# Patient Record
Sex: FEMALE | Race: WHITE | NOT HISPANIC OR LATINO | Employment: OTHER | URBAN - METROPOLITAN AREA
[De-identification: names, ages, dates, MRNs, and addresses within clinical notes are randomized per-mention and may not be internally consistent; named-entity substitution may affect disease eponyms.]

---

## 2023-11-29 DIAGNOSIS — M54.16 LUMBAR NEURITIS: Primary | ICD-10-CM

## 2023-11-29 RX ORDER — LIDOCAINE 50 MG/G
1 PATCH TOPICAL DAILY
COMMUNITY
End: 2023-11-29 | Stop reason: SDUPTHER

## 2023-12-01 RX ORDER — LIDOCAINE 50 MG/G
1 PATCH TOPICAL DAILY
Qty: 30 PATCH | Refills: 11 | Status: SHIPPED | OUTPATIENT
Start: 2023-12-01

## 2023-12-06 ENCOUNTER — OFFICE VISIT (OUTPATIENT)
Dept: PAIN MEDICINE | Facility: CLINIC | Age: 58
End: 2023-12-06
Payer: COMMERCIAL

## 2023-12-06 DIAGNOSIS — M51.26 LUMBAR DISC HERNIATION: ICD-10-CM

## 2023-12-06 DIAGNOSIS — M54.2 CERVICALGIA: ICD-10-CM

## 2023-12-06 DIAGNOSIS — G89.29 CHRONIC NECK PAIN: Primary | ICD-10-CM

## 2023-12-06 DIAGNOSIS — M54.12 CERVICAL RADICULITIS: ICD-10-CM

## 2023-12-06 DIAGNOSIS — M54.16 LUMBAR NEURITIS: ICD-10-CM

## 2023-12-06 DIAGNOSIS — M54.2 CHRONIC NECK PAIN: Primary | ICD-10-CM

## 2023-12-06 PROCEDURE — 99214 OFFICE O/P EST MOD 30 MIN: CPT | Performed by: ANESTHESIOLOGY

## 2023-12-06 RX ORDER — BUSPIRONE HYDROCHLORIDE 5 MG/1
5 TABLET ORAL 2 TIMES DAILY
COMMUNITY

## 2023-12-06 RX ORDER — ALBUTEROL SULFATE 90 UG/1
2 AEROSOL, METERED RESPIRATORY (INHALATION) EVERY 6 HOURS PRN
COMMUNITY

## 2023-12-06 RX ORDER — ATORVASTATIN CALCIUM 10 MG/1
10 TABLET, FILM COATED ORAL DAILY
COMMUNITY

## 2023-12-06 RX ORDER — OMEPRAZOLE 20 MG/1
20 CAPSULE, DELAYED RELEASE ORAL
COMMUNITY

## 2023-12-06 RX ORDER — HYDROCODONE BITARTRATE AND ACETAMINOPHEN 5; 325 MG/1; MG/1
1 TABLET ORAL EVERY 6 HOURS PRN
COMMUNITY

## 2023-12-06 RX ORDER — CYCLOBENZAPRINE HCL 5 MG
5 TABLET ORAL
COMMUNITY

## 2023-12-06 RX ORDER — METOPROLOL SUCCINATE 50 MG/1
50 TABLET, EXTENDED RELEASE ORAL DAILY
COMMUNITY

## 2023-12-06 RX ORDER — IBUPROFEN 800 MG/1
800 TABLET ORAL EVERY 6 HOURS PRN
COMMUNITY

## 2023-12-06 RX ORDER — ACETAMINOPHEN 500 MG
2000 TABLET ORAL DAILY
COMMUNITY

## 2023-12-06 RX ORDER — LISINOPRIL 2.5 MG/1
2.5 TABLET ORAL DAILY
COMMUNITY

## 2023-12-06 SDOH — ECONOMIC STABILITY: FOOD INSECURITY: WITHIN THE PAST 12 MONTHS, YOU WORRIED THAT YOUR FOOD WOULD RUN OUT BEFORE YOU GOT MONEY TO BUY MORE.: NEVER TRUE

## 2023-12-06 SDOH — ECONOMIC STABILITY: FOOD INSECURITY: WITHIN THE PAST 12 MONTHS, THE FOOD YOU BOUGHT JUST DIDN'T LAST AND YOU DIDN'T HAVE MONEY TO GET MORE.: NEVER TRUE

## 2023-12-06 ASSESSMENT — COLUMBIA-SUICIDE SEVERITY RATING SCALE - C-SSRS
2. HAVE YOU ACTUALLY HAD ANY THOUGHTS OF KILLING YOURSELF?: NO
1. IN THE PAST MONTH, HAVE YOU WISHED YOU WERE DEAD OR WISHED YOU COULD GO TO SLEEP AND NOT WAKE UP?: NO
6. HAVE YOU EVER DONE ANYTHING, STARTED TO DO ANYTHING, OR PREPARED TO DO ANYTHING TO END YOUR LIFE?: NO

## 2023-12-06 ASSESSMENT — ENCOUNTER SYMPTOMS
OCCASIONAL FEELINGS OF UNSTEADINESS: 0
LOSS OF SENSATION IN FEET: 0

## 2023-12-06 ASSESSMENT — PATIENT HEALTH QUESTIONNAIRE - PHQ9
SUM OF ALL RESPONSES TO PHQ9 QUESTIONS 1 AND 2: 0
1. LITTLE INTEREST OR PLEASURE IN DOING THINGS: NOT AT ALL
2. FEELING DOWN, DEPRESSED OR HOPELESS: NOT AT ALL

## 2023-12-06 ASSESSMENT — LIFESTYLE VARIABLES
AUDIT-C TOTAL SCORE: 0
HOW OFTEN DO YOU HAVE SIX OR MORE DRINKS ON ONE OCCASION: NEVER
SKIP TO QUESTIONS 9-10: 1
HOW OFTEN DO YOU HAVE A DRINK CONTAINING ALCOHOL: NEVER
HOW MANY STANDARD DRINKS CONTAINING ALCOHOL DO YOU HAVE ON A TYPICAL DAY: PATIENT DOES NOT DRINK

## 2023-12-06 NOTE — PROGRESS NOTES
History Of Present Illness  Karoline Carrera is a 58 y.o. female presenting with   Chief Complaint   Patient presents with    Neck Pain    Headache    Arm Pain     8/10 neck, head radiating into left arm       Patient returns does have a history of chronic NECK PAIN TO THE BILATERAL SHOULDER area worse on the LEFT side.      She was seen for chest pain by her NP and had work up done which was negative. Her worst pain currently is into her LLE hip and thigh area.      The pain is constant, worse with activity and better with rest. The pain is sharp, stabbing and shooting to the B/L LE. Denies LE paresthesias, weakness, saddle anesthesia, bowel or bladder incontinence. To manage this pain the patient has attempted Vicodin as prescribed by her PCP. The patient has undergone a IDALIA on May 11 2016 with 50% relief for several weeks with gradual return to baseline.      PAIN SCORE: 8/10 in her mid back with burning.      FHx  Of note: Pt previously reports her father is an alcoholic and she has been traveling back from [place].      Pt reports her daughter's father was diagnosed with stage 4 bladder cancer in April of 2020 and has reportedly has only 6 months to live. He reportedly is s/p spinal mass resection at [facility].      PCP: Nora Cain        Past Medical History  She has a past medical history of Other specified health status.    Surgical History  She has a past surgical history that includes Tonsillectomy (04/08/2016); Other surgical history (04/07/2022); and CT angio coronary art with heartflow if score >30% (3/31/2023).     Social History  She has no history on file for tobacco use, alcohol use, and drug use.    Family History  No family history on file.     Allergies  Sulfa (sulfonamide antibiotics)    Review of Systems    All other systems reviewed and negative for any deficits. Pertinent positives and negatives were considered in the medical decision making process.        Physical Exam  There were no  "vitals taken for this visit.    General: Pt appears stated age     Eyes: Conjunctiva non-icteric and lids without obvious rash or drooping. Pupils are symmetric     Neck: No JVD noted, tracheal position midline.     Musculoskeletal: Gait is grossly normal     Digits/nails show no clubbing or cyanosis     Exam of muscles/joints/bones shows no gross atrophy and no abnormal/involuntary movements in the head/neckNo asymmetry or masses noted in the head/neck     Stability: no subluxation noted on movement of bilateral upper extremities or head/neck     Strength: 4/5 in RLE and 5/5 in LLE   Positive straight leg raise test in RLE     Range of Motion: WNL      Sensation: decreased to sharp touch in right hand      Cranial nerves 2-12 are grossly intact     Psychiatric: Pt is alert and oriented to time, place and person.         Assessment/Plan   No diagnosis found.        · Chronic low back pain (724.2,338.29) (M54.50,G89.29)   · Lumbosacral neuritis (724.4) (M54.17)   · Lumbar disc herniation (722.10) (M51.26)   · Lumbar radiculopathy, right (724.4) (M54.16)   · Cervical disc herniation (722.0) (M50.20)   · Cervical radicular pain (723.4) (M54.12)     Provider Impressions        1. The pt attempted GAbapentin, but reports it limits her mental function and was difficult for her to work and drive.      Pt signed a [employer] contract on 4-7-2022 and it was reviewed line by line .  Pt will provide UDS on 4-7-2022.      Pt has since d/c Lyrica on her own due to feeling foggy.      Recall: She was doing well with Cymbalta 60mg once a day. She reports her neck and leg pain has reduced since starting this medication. We previously did increase her dosage to Cymbalta 80mg at bedtime and she reported she felt drowsy and has since weaned herself of she also reported nightmares. Her PCP took her off of this medication due to \"brain fog\" which improved after she discontinued it.      WILL ALSO OBTAIN AN MRI OF THE THORACIC SPINE since " her pain has not improved. This was reportedly denied by her insurance.      She has attempted over 6 weeks of doctor supervised therapy with no improvement in her mid back pain. She has also been on Duloxetine and Lidoderm patches.      Pt worked on her exercises for 30 minutes a day for over 6 weeks. She focused on stretching her mid back with flexion and extension and also worked on core strengthening including Karen exercises and crunches. She reports no improvement in her pain since doing these exercises.      Pt does report her burning pain and anxiety has improved since being on Lyrica.      2. The pt had an MRI in 2016 which showed She has multiple disc herniations in her cervical spine and has severe LEFT sided foraminal stenosis at C5/6 and C6/7.      I again reviewed the patients MRI findings (8-) in detail, including review of the actual images and provided a detailed explanation of the findings using a spine model. I also compared this to her previous MRI from 2014. There is a new right sided disc herniation with recess and foraminal stenosis at the L4/5 which is new when compared to her previous MRI from 2014.      3. The pt does have MRI evidence of lumbar disc herniation and did obtain greater than 75% relief with her last lumbar injection. She has continued on an intensive home exercise and therapy program that was reviewed in the office today for over 6 months. She has also continued on Vicodin 5/325mg and Cymbalta 60mg once daily as well for the past 6 months.     Her last cervical injection was 5-2020 with 60% relief lasting 1 months time. She reports her pain has returned with numbness in her left arm. She reports she has trouble sleeping due to her pain now 1 month later. After her injection she reports she was able to sleep with less pain.      She has continued home PT exercises as reviewed with us in the office today and at her previous visits. She has been working on exercises as  reviewed for over 6 consecutive months and has also been on Cymbalta and Tizanidine for over 6 months as well.      4. The patient is a candidate for a TESI vs LESI L5/S1 vs LEFT versus RIGHT L4 AND L5 LTR to treat back and radicular pain. I spent time with the patient discussing all of the risks, benefits, and alternatives to this measure. Including but not limited to spinal infection, epidural hematoma/abscess, paralysis, nerve injury, steroid effects, and spinal headache. The patient understands and agrees to proceed.     Her most recent injection was 2- for a right LTR and prior to that on 2-2021 and she again reported 50% last lumbar injection was over 6 months ago in January / March of 2020. She reported 50% relief with that injection and that she was able to sleep and walk with less pain. However with evidence of a new disc herniation at L4/5 as noted above would recommend a repeat procedure since she reports trouble sleeping due to pain.      Her last TESI was on 2- and she reported greater than 50% relief of her pain that lasted for 1 months time and has slowly worn off. She has been able to stand and pivot with much less pain.      5. We once again did discuss the risks, benefits, and alternatives to chronic opioid therapy and that usage can be a danger to life. We also discussed proper and safe storage of medication to prevent unauthorized usage. The patient understands and will use the medicine responsibly as managed by her PCP Nora Cain.      Recall: We did discussed OIH at several visits. Pt reports she is interested in weaning off of this medication.     6. I have referred the patient to physical therapy/aqua therapy to learn and perform exercises to strengthen core/extremties, maintain stabilization, increase range of motion, and reduce pain.    Pt would like to try Aquatherapy.     Matthew Neal MD     I spent time with the patient reviewing their imaging and discussing the risks  benefits and alternatives to the above plan. A total of 30 minutes was spent reviewing the data and greater than 50% of that time was with the patient during the face to face encounter discussing treatment options both surgical, non-surgical, and minimally invasive techniques.

## 2023-12-20 ENCOUNTER — APPOINTMENT (OUTPATIENT)
Dept: PHYSICAL THERAPY | Facility: CLINIC | Age: 58
End: 2023-12-20
Payer: COMMERCIAL

## 2024-01-04 ENCOUNTER — EVALUATION (OUTPATIENT)
Dept: PHYSICAL THERAPY | Facility: CLINIC | Age: 59
End: 2024-01-04
Payer: COMMERCIAL

## 2024-01-04 DIAGNOSIS — M54.12 CERVICAL RADICULITIS: ICD-10-CM

## 2024-01-04 DIAGNOSIS — G89.29 CHRONIC NECK PAIN: Primary | ICD-10-CM

## 2024-01-04 DIAGNOSIS — M54.2 CHRONIC NECK PAIN: Primary | ICD-10-CM

## 2024-01-04 DIAGNOSIS — M54.16 LUMBAR NEURITIS: ICD-10-CM

## 2024-01-04 DIAGNOSIS — M54.2 CERVICALGIA: ICD-10-CM

## 2024-01-04 DIAGNOSIS — M51.26 LUMBAR DISC HERNIATION: ICD-10-CM

## 2024-01-04 PROCEDURE — 97161 PT EVAL LOW COMPLEX 20 MIN: CPT | Mod: GP
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
## 2024-01-09 ENCOUNTER — TREATMENT (OUTPATIENT)
Dept: PHYSICAL THERAPY | Facility: CLINIC | Age: 59
End: 2024-01-09
Payer: COMMERCIAL

## 2024-01-09 DIAGNOSIS — M54.12 CERVICAL RADICULITIS: ICD-10-CM

## 2024-01-09 DIAGNOSIS — M54.2 CHRONIC NECK PAIN: ICD-10-CM

## 2024-01-09 DIAGNOSIS — M51.26 LUMBAR DISC HERNIATION: ICD-10-CM

## 2024-01-09 DIAGNOSIS — M54.2 CERVICALGIA: ICD-10-CM

## 2024-01-09 DIAGNOSIS — M54.16 LUMBAR NEURITIS: ICD-10-CM

## 2024-01-09 DIAGNOSIS — G89.29 CHRONIC NECK PAIN: ICD-10-CM

## 2024-01-09 PROCEDURE — 97113 AQUATIC THERAPY/EXERCISES: CPT | Mod: GP,CQ

## 2024-01-09 NOTE — PROGRESS NOTES
Physical Therapy  Physical Therapy Progress Note    Patient Name Karoline Carrera   MRN: [id number]  Today's Date: 01/09/24  Time Calculation  Start Time: 0230  Stop Time: 0315  Time Calculation (min): 45 min    Insurance:    Visit #: 2  Insurance Reviewed  (per information provided by  pre-cert team)   Authorization required:  Y  Approved # of visits: 12 + Eval = 13  Authorization/MCR certification date range:  1/5/2024 to 2/23/2024    Therapy Diagnoses:   1. Chronic neck pain  Follow Up In Physical Therapy      2. Cervical radiculitis  Follow Up In Physical Therapy      3. Cervicalgia  Follow Up In Physical Therapy      4. Lumbar disc herniation  Follow Up In Physical Therapy      5. Lumbar neuritis  Follow Up In Physical Therapy          General:  RReason for visit: Chronic  neck and lumbar pain with radiculitis   Referred by: Dr. Ángel Chakraborty MD appt:  6 months  Preferred Name:  Karoline  Script:  Aquatics  Onset Date:  12/6/2023     Assessment:  Patient tolerated initial aquatic treatment well, with decreased pain and improved mobility.   Needs continued work on/skilled PT for remaining functional, objective and subjective deficits to allow them to return to prior /optimal level of function with ADLs.  Patient is progressing with function/goals: of improved body awareness, decreased postural guarding and improved trunk stabilization and TA activation with verbal cueing and demonstration from deck.   Skilled care:  aquatic treatment initiation, patient education    Plan:    Continue with poc as documented in the legacy system.     Continue to progress per poc:   NV add yoga and  Chi per tolerance. Add DLS alternating paddle resistance at wall.    Subjective:   Patient reports her entire body hurts because of the cold, wet weather.     Have you fallen since last visit:  No    Precautions: No fall risk  Cardiac issue, HTN, SOB at times    Pain:  6/10  Location/Type of pain:  everywhere    HEP  "compliance/understanding:  N/A building aquatic [facility]    Objective:   Objective Measurements:    Function:   Has noticed a decrease in posture due to pain/guarding  Posture: FWD head, rounded shoulders  Gait: Ambulates on pool deck with moderate lukasz and moderate step length without assistive device with [place]  Ascends/Descends stairs w/ step to gait w/ bilateral hand rail support      Treatment:   **= HEP  NV= Next visit  np= not performed  nb= non-billable  G= group HEP= discharged to [facility]    Aquatics:          45 minutes  **Sinker**  **Sinker or Floater**  **plans to go to [facility] for AQUA HEP**    TM=Treadmill, T=Speed, C=Current, BTW = Back To Wall, NB=non-billable, NV=next visit    TM FWD T= 2 C=0 3 minutes  TM Retro T=0 C=0 2 minutes  FWD/Retro w/ BUE AROM NV   Lateral ambulation with BUE ABD/ADD 4 laps    Corner Pec Stretch 30”/2 March with  barbell support x 10  Partial Squats with open paddles BUE flex/ext NV  Thoracic rotation at wall NV  Wall Push Ups  NV  Retro shoulder rolls x 10  Scap squeeze 5”/10    Yoga Tree <-> Warrior 3 <-> Warrior 2 -> Reverse Andreas NV   Chi NV    BTW cervical retraction NV  BTW open board push/pull, up/down x 10  BTW rows with open paddles NV  BTW BUE open paddle flex/ext, ABD/ADD x 10 each  BTW cancans x 10 francheska    Deep End with cervical collar:  Cycle 3 minutes  Cross country 1 minute  Jumping Jacks 1 minute  Hang 3 minutes     Stair Stretches: Hamstrings/hip flexors/calves 30\"/2 francheska    Education:  Benefits of Aquatics including buoyancy, resistance ,unloading and hydrostatic pressure postural awareness, pacing in pain free ROM, hydration and rest post initial treatment, PNE and pain scale    HEP Progression:  building AQUA [facility]    "

## 2024-01-11 ENCOUNTER — TREATMENT (OUTPATIENT)
Dept: PHYSICAL THERAPY | Facility: CLINIC | Age: 59
End: 2024-01-11
Payer: COMMERCIAL

## 2024-01-11 DIAGNOSIS — M54.2 CERVICALGIA: ICD-10-CM

## 2024-01-11 DIAGNOSIS — G89.29 CHRONIC NECK PAIN: ICD-10-CM

## 2024-01-11 DIAGNOSIS — M54.2 CHRONIC NECK PAIN: ICD-10-CM

## 2024-01-11 DIAGNOSIS — M54.12 CERVICAL RADICULITIS: ICD-10-CM

## 2024-01-11 DIAGNOSIS — M54.16 LUMBAR NEURITIS: ICD-10-CM

## 2024-01-11 DIAGNOSIS — M51.26 LUMBAR DISC HERNIATION: ICD-10-CM

## 2024-01-11 PROCEDURE — 97113 AQUATIC THERAPY/EXERCISES: CPT | Mod: GP,CQ

## 2024-01-11 NOTE — PROGRESS NOTES
Physical Therapy  Physical Therapy Progress Note    Patient Name Karoline Carrera   MRN: [id number]  Today's Date: 01/11/24  Time Calculation  Start Time: 0235  Stop Time: 0315  Time Calculation (min): 40 min    Insurance:    Visit #: 3  Insurance Reviewed  (per information provided by  pre-cert team)   Authorization required:  Y  Approved # of visits: 12 + Eval = 13  Authorization/MCR certification date range:  1/5/2024 to 2/23/2024    Therapy Diagnoses:   1. Chronic neck pain  Follow Up In Physical Therapy      2. Cervical radiculitis  Follow Up In Physical Therapy      3. Cervicalgia  Follow Up In Physical Therapy      4. Lumbar disc herniation  Follow Up In Physical Therapy      5. Lumbar neuritis  Follow Up In Physical Therapy          General:  Reason for visit: Chronic  neck and lumbar pain with radiculitis   Referred by: Dr. Ángel Chakraborty MD appt:  6 months  Preferred Name:  Karoline  Script:  Aquatics  Onset Date:  12/6/2023     Assessment:  Patient tolerated aquatic treatment progression well, with decreased pain and improved mobility. Ambulated on treadmill with increased bilateral heel strike and toe off with decreased cueing required, displaying improved BLE control against buoyancy.  Needs continued work on/skilled PT for remaining functional, objective and subjective deficits to allow them to return to prior /optimal level of function with ADLs.  Patient is progressing with function/goals: of improved body awareness, decreased postural guarding and improved trunk stabilization and BLE control against buoyancy.  Skilled care:  aquatic treatment initiation, patient education    Plan:    Continue to progress per poc:   NV add yoga per tolerance. Add DLS alternating paddle resistance at wall push ups NV.    Subjective:   Patient reports she got a membership for [employer]. States that she was a little sore after her first aquatic visit.     Have you fallen since last visit:  No    Precautions: No fall  "risk  Cardiac issue, HTN, SOB at times    Pain:  3/10  Location/Type of pain:  cervical    HEP compliance/understanding:  N/A building aquatic HEP    Objective:   Objective Measurements:    Function:   Has noticed a decrease in posture due to pain/guarding  Posture: FWD head, rounded shoulders  Gait: Ambulates on pool deck with moderate lukasz and moderate step length without assistive device with [place]  Ascends/Descends stairs w/ step to gait w/ bilateral hand rail support      Treatment:   **= HEP  NV= Next visit  np= not performed  nb= non-billable  G= group HEP= discharged to [facility]    Aquatics:          40 minutes  **Sinker**  **Sinker or Floater**  **plans to go to [facility] for AQUA [facility]**    TM=Treadmill, T=Speed, C=Current, BTW = Back To Wall, NB=non-billable, NV=next visit    TM FWD T= 2 C=0 3 minutes  TM Retro T=0 C=0 2 minutes  FWD/Retro w/ BUE AROM NV   Lateral ambulation with BUE ABD/ADD 4 laps  Plank <-> snow imelda w/ BLE hip ABD x 10 each    Corner Pec Stretch 30”/2 March with  barbell support x 10  Partial Squats with open paddles BUE flex/ext NV  Thoracic rotation at wall NV  Wall Push Ups  NV  Retro shoulder rolls x 10  Scap squeeze 5”/10    Yoga Tree <-> Warrior 3 <-> Warrior 2 -> Reverse [place] NV   Chi #1-#5 w/ diaphragmatic breathing x 5 each    BTW cervical retraction NV  BTW open board push/pull, up/down x 10  BTW rows with open paddles NV  BTW BUE open paddle flex/ext, ABD/ADD x 10 each  BTW cancans x 10 francheska    Deep End with cervical collar:  Cycle 3 minutes  Cross country 1 minute  Jumping Jacks 1 minute  Hang 3 minutes     Stair Stretches: Hamstrings/hip flexors/calves 30\"/2 francheska    Education:  aquatic exercise progression, TA activation, postural awareness, trunk stabilization and blaance with ai Chi progression    HEP Progression:  building AQUA HEP  "

## 2024-01-16 ENCOUNTER — TREATMENT (OUTPATIENT)
Dept: PHYSICAL THERAPY | Facility: CLINIC | Age: 59
End: 2024-01-16
Payer: COMMERCIAL

## 2024-01-16 DIAGNOSIS — M51.26 LUMBAR DISC HERNIATION: ICD-10-CM

## 2024-01-16 DIAGNOSIS — G89.29 CHRONIC NECK PAIN: ICD-10-CM

## 2024-01-16 DIAGNOSIS — M54.12 CERVICAL RADICULITIS: ICD-10-CM

## 2024-01-16 DIAGNOSIS — M54.2 CERVICALGIA: ICD-10-CM

## 2024-01-16 DIAGNOSIS — M54.16 LUMBAR NEURITIS: ICD-10-CM

## 2024-01-16 DIAGNOSIS — M54.2 CHRONIC NECK PAIN: ICD-10-CM

## 2024-01-16 PROCEDURE — 97113 AQUATIC THERAPY/EXERCISES: CPT | Mod: GP,CQ

## 2024-01-16 NOTE — PROGRESS NOTES
Physical Therapy  Physical Therapy Progress Note    Patient Name Karoline Carrera   MRN: [id number]  Today's Date: 01/16/24  Time Calculation  Start Time: 0230  Stop Time: 0315  Time Calculation (min): 45 min    Insurance:    Visit #: 4  Insurance Reviewed  (per information provided by  pre-cert team)   Authorization required:  Y  Approved # of visits: 12 + Eval = 13  Authorization/MCR certification date range:  1/5/2024 to 2/23/2024    Therapy Diagnoses:   1. Chronic neck pain  Follow Up In Physical Therapy      2. Cervical radiculitis  Follow Up In Physical Therapy      3. Cervicalgia  Follow Up In Physical Therapy      4. Lumbar disc herniation  Follow Up In Physical Therapy      5. Lumbar neuritis  Follow Up In Physical Therapy          General:  Reason for visit: Chronic  neck and lumbar pain with radiculitis   Referred by: Dr. Ángel Chakraborty MD appt:  6 months  Preferred Name:  Karoline  Script:  Aquatics  Onset Date:  12/6/2023     Assessment:  Patient tolerated aquatic treatment progression well, with decreased pain and improved mobility. Ambulated on treadmill with increased bilateral heel strike and toe off with decreased cueing required, displaying improved BLE control against buoyancy.  Needs continued work on/skilled PT for remaining functional, objective and subjective deficits to allow them to return to prior /optimal level of function with ADLs.  Patient is progressing with function/goals: of improved body awareness, decreased postural guarding, improved dynamic balance and improved trunk stabilization and BLE control against buoyancy.  Skilled care:  aquatic treatment initiation, patient education    Plan:    Continue to progress per poc:   NV add multi-directional strengthening against current per tolerance. Add daily journaling to HEP NV.     Subjective:   Patient reports she is focused on her self care and healing and feels aquatic therapy is helping.     Have you fallen since last visit:   "No    Precautions: No fall risk  Cardiac issue, HTN, SOB at times    Pain:  3/10  Location/Type of pain:  cervical    HEP compliance/understanding:  N/A building aquatic HEP    Objective:   Objective Measurements:    Function:   Improved postural awareness and is working on mindfulness at home  Posture: decreased FWD head and decreased rounded shoulders this visit  Gait: Ambulates on pool deck with moderate lukasz and moderate step length without assistive device with [place]  Ascends/Descends stairs w/ step to gait w/ bilateral hand rail support        Treatment:   **= HEP  NV= Next visit  np= not performed  nb= non-billable  G= group HEP= discharged to HEP    Aquatics:          45 minutes  **Sinker**  **Sinker or Floater**  **plans to go to [facility] for AQUA HEP**    TM=Treadmill, T=Speed, C=Current, BTW = Back To Wall, NB=non-billable, NV=next visit    TM FWD T= 2 C=10 3 minutes  TM Retro T=0 C=10 2 minutes  FWD/Retro w/ BUE AROM NV   C=10 Lateral ambulation with BUE open paddles ABD/ADD 4 laps each against current   Plank <-> snow imelda w/ BLE hip ABD x 10 each NV    Corner Pec Stretch 30”/2 NV  March with  BUE open paddle punches facing C  x 10  Partial Squats with open paddles BUE flex/ext NV  C=10 3 way hip AROM in TM bars x 10 each against C  Thoracic rotation at wall NV  Wall Push Ups  NV  Retro shoulder rolls x 10  Scap squeeze 5”/10    Yoga Tree <-> Warrior 2 <-> Warrior 1  5 diaphragmatic breaths x 2 each francheska   Chi #1-#5 w/ diaphragmatic breathing x 5 each NV    BTW cervical retraction NV  BTW open board push/pull, up/down x 10  BTW rows with open paddles NV  BTW BUE open paddle flex/ext, ABD/ADD x 10 each NV  BTW cancans x 10 francheska    Deep End with cervical collar:  Cycle 3 minutes  Cross country 1 minute  Jumping Jacks 1 minute  Hang w/ box breathing 3 minutes     Stair Stretches: Hamstrings/hip flexors/calves 30\"/2 francheska    Education:  aquatic exercise progression, TA activation, " postural awareness, trunk stabilization and blaance with yoga     HEP Progression:  added mindfulness books and box breathing guided meditation this visit

## 2024-01-18 ENCOUNTER — TREATMENT (OUTPATIENT)
Dept: PHYSICAL THERAPY | Facility: CLINIC | Age: 59
End: 2024-01-18
Payer: COMMERCIAL

## 2024-01-18 DIAGNOSIS — M54.2 CERVICALGIA: ICD-10-CM

## 2024-01-18 DIAGNOSIS — M51.26 LUMBAR DISC HERNIATION: ICD-10-CM

## 2024-01-18 DIAGNOSIS — M54.2 CHRONIC NECK PAIN: Primary | ICD-10-CM

## 2024-01-18 DIAGNOSIS — M54.16 LUMBAR NEURITIS: ICD-10-CM

## 2024-01-18 DIAGNOSIS — G89.29 CHRONIC NECK PAIN: Primary | ICD-10-CM

## 2024-01-18 DIAGNOSIS — M54.12 CERVICAL RADICULITIS: ICD-10-CM

## 2024-01-18 PROCEDURE — 97113 AQUATIC THERAPY/EXERCISES: CPT | Mod: GP,CQ

## 2024-01-18 NOTE — PROGRESS NOTES
Physical Therapy  Physical Therapy Progress Note    Patient Name Karoline Carrera   MRN: [id number]  Today's Date: 01/18/24  Time Calculation  Start Time: 1215  Stop Time: 1315  Time Calculation (min): 60 min    Insurance:    Visit #: 5  Insurance Reviewed  (per information provided by  pre-cert team)   Authorization required:  Y  Approved # of visits: 12 + Eval = 13  Authorization/MCR certification date range:  1/5/2024 to 2/23/2024    Therapy Diagnoses:   1. Chronic neck pain        2. Cervical radiculitis        3. Cervicalgia        4. Lumbar disc herniation        5. Lumbar neuritis              General:  Reason for visit: Chronic  neck and lumbar pain with radiculitis   Referred by: Dr. Ángel Chakraborty MD appt:  6 months  Preferred Name:  Karoline  Script:  Aquatics  Onset Date:  12/6/2023     Assessment:  Patient tolerated treatment session well, with reduced pain with BTW piriformis stretching and with deep end unloading. Improved muscle relaxation and decreased postural guarding with addition of Win Hof guided breathing meditation. .  Needs continued work on/skilled PT for remaining functional, objective and subjective deficits to allow them to return to prior /optimal level of function with ADLs.  Patient is progressing with function/goals: of improved body awareness, decreased postural guarding, improved dynamic balance and improved trunk stabilization and BLE control against buoyancy.  Skilled care:  aquatic treatment, patient education    Plan:    Continue to progress per poc:   NV add multi-directional strengthening against current per tolerance. Add daily journaling to HEP NV.     Subjective:   Patient reports her left buttock has been bothering her. Feels relief n the pool. Feels less pain since starting aquatic therapy. Plans to start [facility] in the next few weeks.     Have you fallen since last visit:  No    Precautions: No fall risk  Cardiac issue, HTN, SOB at times    Pain:   "6-7/10  Location/Type of pain:  L glutes    HEP compliance/understanding:  N/A building aquatic HEP    Objective:   Objective Measurements:    Function:   Improved postural awareness and is working on mindfulness at home  Posture: decreased FWD head and decreased rounded shoulders this visit  Gait: Ambulates on pool deck with moderate lukasz and moderate step length without assistive device with [place]  Ascends/Descends stairs w/ step to gait w/ bilateral hand rail support        Treatment:   **= HEP  NV= Next visit  np= not performed  nb= non-billable  G= group HEP= discharged to [facility]    Aquatics:          60 minutes  **Sinker**  **Sinker or Floater**  **plans to go to [facility] for AQUA HEP**    TM=Treadmill, T=Speed, C=Current, BTW = Back To Wall, NB=non-billable, NV=next visit    TM FWD T= 2 C=10 3 minutes  TM Retro T=0 C=10 2 minutes  FWD/Retro w/ BUE AROM NV   C=10 Lateral ambulation with BUE open paddles ABD/ADD 4 laps each against current   Plank <-> snow imelda w/ BLE hip ABD x 10 each NV    Corner Pec Stretch 30”/2 NV  C=10 March with  BUE open paddle punches facing C  x 10  Partial Squats with open paddles BUE flex/ext NV  C=10 3 way hip AROM w/ wall and barbell support x 10 each against C  Thoracic rotation at wall NV  Wall Push Ups  NV  Retro shoulder rolls x 10 NV  Scap squeeze 5”/10    Yoga Tree <-> Warrior 2 <-> Warrior 1  5 diaphragmatic breaths x 2 each francheska NV   Chi #1-#5 w/ diaphragmatic breathing x 5 each     BTW cervical retraction NV  BTW open board push/pull, up/down x 10  BTW rows with open paddles NV  BTW BUE open paddle flex/ext, ABD/ADD x 10 each NV  BTW cancans x 10 francheska  BTW piriformis stretch 30\"/3 francheska    Deep End with cervical collar:  Cycle 3 minutes  Cross country 1 minute  Jumping Jacks 1 minute  Hang w/ Wim Hof breathing 3 minutes     Stair Stretches: Hamstrings/hip flexors/calves 30\"/2 francheska    Education:  aquatic exercise progression, TA activation, postural " awareness, trunk stabilization and blaance with yoga     HEP Progression:  added Wellness Wheel, daily Wim Hof meditation breathing

## 2024-01-23 ENCOUNTER — TREATMENT (OUTPATIENT)
Dept: PHYSICAL THERAPY | Facility: CLINIC | Age: 59
End: 2024-01-23
Payer: COMMERCIAL

## 2024-01-23 DIAGNOSIS — M54.16 LUMBAR NEURITIS: ICD-10-CM

## 2024-01-23 DIAGNOSIS — M54.12 CERVICAL RADICULITIS: ICD-10-CM

## 2024-01-23 DIAGNOSIS — M51.26 LUMBAR DISC HERNIATION: ICD-10-CM

## 2024-01-23 DIAGNOSIS — G89.29 CHRONIC NECK PAIN: ICD-10-CM

## 2024-01-23 DIAGNOSIS — M54.2 CHRONIC NECK PAIN: ICD-10-CM

## 2024-01-23 DIAGNOSIS — M54.2 CERVICALGIA: ICD-10-CM

## 2024-01-23 PROCEDURE — 97113 AQUATIC THERAPY/EXERCISES: CPT | Mod: GP,CQ

## 2024-01-23 NOTE — PROGRESS NOTES
Physical Therapy  Physical Therapy Progress Note    Patient Name Karoline Carrera   MRN: [id number]  Today's Date: 01/23/24  Time Calculation  Start Time: 0235  Stop Time: 0320  Time Calculation (min): 45 min    Insurance:    Visit #: 6  Insurance Reviewed  (per information provided by  pre-cert team)   Authorization required:  Y  Approved # of visits: 12 + Eval = 13  Authorization/MCR certification date range:  1/5/2024 to 2/23/2024    Therapy Diagnoses:   1. Chronic neck pain  Follow Up In Physical Therapy      2. Cervical radiculitis  Follow Up In Physical Therapy      3. Cervicalgia  Follow Up In Physical Therapy      4. Lumbar disc herniation  Follow Up In Physical Therapy      5. Lumbar neuritis  Follow Up In Physical Therapy            General:  Reason for visit: Chronic  neck and lumbar pain with radiculitis   Referred by: Dr. Ángel Chakraborty MD appt:  6 months  Preferred Name:  Karoline  Script:  Aquatics  Onset Date:  12/6/2023     Assessment:  Patient tolerated treatment session well, challenged with dynamic balance with ambulation against current w/o BUE support. .  Needs continued work on/skilled PT for remaining functional, objective and subjective deficits to allow them to return to prior /optimal level of function with ADLs.  Patient is progressing with function/goals: of improved body awareness, decreased postural guarding, improved dynamic balance and improved trunk stabilization and BLE control against buoyancy.  Skilled care:  aquatic treatment, patient education    Plan:    Continue to progress per poc:   NV add multi-directional HR/TR against current NV.     Subjective:   Patient reports her neck is pretty sore from the cold, damp weather. Going for a mammogram after aquatic therapy. Did the Wellness Wheel homework and it went well.     Have you fallen since last visit:  No    Precautions: No fall risk  Cardiac issue, HTN, SOB at times    Pain:  7/10  Location/Type of pain:  L glutes    HEP  "compliance/understanding:  N/A building aquatic [facility]    Objective:   Objective Measurements:    Function:   Improved postural awareness and is working on mindfulness at home  Posture: decreased FWD head and decreased rounded shoulders this visit  Gait: Ambulates on pool deck with moderate lukasz and moderate step length without assistive device with [place]  Ascends/Descends stairs w/ step to gait w/ bilateral hand rail support        Treatment:   **= HEP  NV= Next visit  np= not performed  nb= non-billable  G= group HEP= discharged to [facility]    Aquatics:          45 minutes  **Sinker**  **Sinker or Floater**  **plans to go to [facility] for AQUA HEP**    TM=Treadmill, T=Speed, C=Current, BTW = Back To Wall, NB=non-billable, NV=next visit    TM FWD T= 2 C=10 3 minutes  TM Retro T=0 C=10 2 minutes  FWD/Retro w/ BUE AROM 4 laps Retro requried 1UE support at wall to avoid lumbar extension   C=10 Lateral ambulation with ABD/ADD (no paddles this visit)  4 laps each against current   4 way marching w/ reciprocal BUE punches x 10 each  Plank <-> snow imelda w/ BLE hip ABD x 10 each NV    Corner Pec Stretch 20\"/2  Self pin and stretch to pecs w/ cervical circumduction CW/CCW x 10 each  C=10 March with  BUE open paddle punches facing C  x 10  Partial Squats with open paddles BUE flex/ext NV  C=10 3 way hip AROM w/ wall and barbell support x 10 each against C NV  Thoracic rotation at wall 5\" hold x 10  Thoracic circumduction w/ hands on head CW/CCW x 10 each  Wall Push Ups  x 10  Retro shoulder rolls x 10   Scap squeeze 5”/10    Yoga Tree <-> Warrior 2 <-> Warrior 1  5 diaphragmatic breaths x 2 each francheska NV   Chi #1-#5 w/ diaphragmatic breathing x 5 each  NV    BTW cervical retraction NV  BTW open board push/pull, up/down x 10 NV  BTW rows with open paddles NV  BTW BUE open paddle flex/ext, ABD/ADD x 10 each NV  BTW cancans x 10 francheska  BTW piriformis stretch 30\"/3 francheska    Deep End with cervical collar:  Cycle 3 " "minutes  Cross country 1 minute  Jumping Jacks 1 minute  Hang w/ box breathing 3 minutes     Stair Stretches: Hamstrings/hip flexors/calves 30\"/2 francheska NV    Education:  went over results from Wellness Wheel HEP and progressed with daily journaling, aquatic exercise progression     HEP Progression:  patient given daily journaling HEP   "

## 2024-01-25 ENCOUNTER — CLINICAL SUPPORT (OUTPATIENT)
Dept: PHYSICAL THERAPY | Facility: CLINIC | Age: 59
End: 2024-01-25
Payer: COMMERCIAL

## 2024-01-25 DIAGNOSIS — M54.16 LUMBAR NEURITIS: ICD-10-CM

## 2024-01-25 DIAGNOSIS — M54.2 CHRONIC NECK PAIN: Primary | ICD-10-CM

## 2024-01-25 DIAGNOSIS — G89.29 CHRONIC NECK PAIN: ICD-10-CM

## 2024-01-25 DIAGNOSIS — M51.26 LUMBAR DISC HERNIATION: ICD-10-CM

## 2024-01-25 DIAGNOSIS — M54.12 CERVICAL RADICULITIS: ICD-10-CM

## 2024-01-25 DIAGNOSIS — M54.2 CERVICALGIA: ICD-10-CM

## 2024-01-25 DIAGNOSIS — G89.29 CHRONIC NECK PAIN: Primary | ICD-10-CM

## 2024-01-25 DIAGNOSIS — M54.2 CHRONIC NECK PAIN: ICD-10-CM

## 2024-01-25 PROCEDURE — 97113 AQUATIC THERAPY/EXERCISES: CPT | Mod: GP,CQ

## 2024-01-30 ENCOUNTER — TREATMENT (OUTPATIENT)
Dept: PHYSICAL THERAPY | Facility: CLINIC | Age: 59
End: 2024-01-30
Payer: COMMERCIAL

## 2024-01-30 DIAGNOSIS — M54.16 LUMBAR NEURITIS: ICD-10-CM

## 2024-01-30 DIAGNOSIS — M54.2 CHRONIC NECK PAIN: ICD-10-CM

## 2024-01-30 DIAGNOSIS — M54.12 CERVICAL RADICULITIS: ICD-10-CM

## 2024-01-30 DIAGNOSIS — M51.26 LUMBAR DISC HERNIATION: ICD-10-CM

## 2024-01-30 DIAGNOSIS — M54.2 CERVICALGIA: ICD-10-CM

## 2024-01-30 DIAGNOSIS — G89.29 CHRONIC NECK PAIN: ICD-10-CM

## 2024-01-30 PROCEDURE — 97113 AQUATIC THERAPY/EXERCISES: CPT | Mod: GP,CQ

## 2024-01-30 NOTE — PROGRESS NOTES
Physical Therapy  Physical Therapy Progress Note    Patient Name Karoline Carrera   MRN: [id number]  Today's Date: 01/30/24  Time Calculation  Start Time: 0230  Stop Time: 0330  Time Calculation (min): 60 min    Insurance:    Visit #: 8  Insurance Reviewed  (per information provided by  pre-cert team)   Authorization required:  Y  Approved # of visits: 12 + Eval = 13  Authorization/MCR certification date range:  1/5/2024 to 2/23/2024    Therapy Diagnoses:   1. Chronic neck pain  Follow Up In Physical Therapy      2. Cervical radiculitis  Follow Up In Physical Therapy      3. Cervicalgia  Follow Up In Physical Therapy      4. Lumbar disc herniation  Follow Up In Physical Therapy      5. Lumbar neuritis  Follow Up In Physical Therapy            General:  Reason for visit: Chronic  neck and lumbar pain with radiculitis   Referred by: Dr. Ángel Chakraborty MD appt:  6 months  Preferred Name:  Karoline  Script:  Aquatics  Onset Date:  12/6/2023     Assessment:  Patient tolerated treatment session well, challenged with dynamic balance of dual task marching with paddle punches, improving with verbal cueing. Improved static balance with guided imagery of a tree on a windy day.   Needs continued work on/skilled PT for remaining functional, objective and subjective deficits to allow them to return to prior /optimal level of function with ADLs.  Patient is progressing with function/goals: of improved body awareness, decreased postural guarding, improved body awareness with posture and TA activation  Skilled care:  aquatic treatment, patient education, HEP progression    Plan:    Continue to progress per poc:   NV add multi-directional HR/TR against current NV.     Subjective:   Patient reports her neck is pretty sore from the cold, damp weather. Going for a mammogram after aquatic therapy. Did the Wellness Wheel homework and it went well.     Have you fallen since last visit:  No    Precautions: No fall risk  Cardiac issue, HTN,  "SOB at times    Pain:  7/10  Location/Type of pain:  L glutes    HEP compliance/understanding:  N/A building aquatic HEP    Objective:   Objective Measurements:    Function:   Improved postural awareness and is working on mindfulness at home  Posture: decreased FWD head and decreased rounded shoulders this visit  Gait: Ambulates on pool deck with moderate lukasz and moderate step length without assistive device with [place]  Ascends/Descends stairs w/ step to gait w/ bilateral hand rail support        Treatment:   **= HEP  NV= Next visit  np= not performed  nb= non-billable  G= group HEP= discharged to [facility]    Aquatics:          60 minutes  **Sinker**  **Sinker or Floater**  **plans to go to [facility] for AQUA HEP**    TM=Treadmill, T=Speed, C=Current, BTW = Back To Wall, NB=non-billable, NV=next visit    TM FWD T= 2 C=10 3 minutes  TM Retro T=0 C=10 2 minutes  FWD/Retro w/ BUE AROM 4 laps Retro requried 1UE support at wall to avoid lumbar extension   C=10 Lateral ambulation with ABD/ADD (no paddles this visit)  4 laps each against current   4 way marching w/ reciprocal BUE punches x 10 each  Plank <-> snow imelda w/ BLE hip ABD x 10 each NV    Corner Pec Stretch 20\"/2  Self pin and stretch to pecs w/ cervical circumduction CW/CCW x 10 each  C=10 March with  BUE open paddle punches facing C  x 10  Partial Squats with open paddles BUE flex/ext NV  C=10 3 way hip AROM w/ wall and barbell support x 10 each against C NV  Thoracic rotation at wall 5\" hold x 10  Thoracic circumduction w/ hands on head CW/CCW x 10 each  Wall Push Ups  x 10  Retro shoulder rolls x 10   Scap squeeze 5”/10    Yoga Tree <-> Warrior 2 <-> Warrior 1  5 diaphragmatic breaths x 2 each francheska NV   Chi #1-#5 w/ diaphragmatic breathing x 5 each  NV    BTW cervical retraction NV  BTW open board push/pull, up/down x 10 NV  BTW rows with open paddles NV  BTW BUE open paddle flex/ext, ABD/ADD x 10 each NV  BTW cancans x 10 fr[place]ka  BTW " "piriformis stretch 30\"/3 francheska    Deep End with cervical collar:  Cycle 3 minutes  Cross country 1 minute  Jumping Jacks 1 minute  Hang w/ box breathing 3 minutes     Stair Stretches: Hamstrings/hip flexors/calves 30\"/2 francheska     Education:  went over results from Wellness Wheel HEP and progressed with daily journaling, aquatic exercise progression, Vision Board/SMART Goals    HEP Progression:  patient given daily journaling HEP, patient provided with poster board and smart goal setting tools w/ overview  "

## 2024-02-01 ENCOUNTER — TREATMENT (OUTPATIENT)
Dept: PHYSICAL THERAPY | Facility: CLINIC | Age: 59
End: 2024-02-01
Payer: MEDICARE

## 2024-02-01 DIAGNOSIS — M51.26 LUMBAR DISC HERNIATION: ICD-10-CM

## 2024-02-01 DIAGNOSIS — M54.2 CERVICALGIA: ICD-10-CM

## 2024-02-01 DIAGNOSIS — M54.12 CERVICAL RADICULITIS: ICD-10-CM

## 2024-02-01 DIAGNOSIS — G89.29 CHRONIC NECK PAIN: ICD-10-CM

## 2024-02-01 DIAGNOSIS — M54.16 LUMBAR NEURITIS: ICD-10-CM

## 2024-02-01 DIAGNOSIS — M54.2 CHRONIC NECK PAIN: ICD-10-CM

## 2024-02-01 PROCEDURE — 97113 AQUATIC THERAPY/EXERCISES: CPT | Mod: GP,CQ

## 2024-02-01 NOTE — PROGRESS NOTES
Physical Therapy  Physical Therapy Progress Note    Patient Name Karoline Carrera   MRN: [id number]  Today's Date: 02/01/24  Time Calculation  Start Time: 0145  Stop Time: 0230  Time Calculation (min): 45 min    Insurance:    Visit #: 7  Insurance Reviewed  (per information provided by  pre-cert team)   Authorization required:  Y  Approved # of visits: 12 + Eval = 13  Authorization/MCR certification date range:  1/5/2024 to 2/23/2024    Therapy Diagnoses:   1. Chronic neck pain        2. Cervical radiculitis        3. Cervicalgia        4. Lumbar disc herniation        5. Lumbar neuritis                General:  Reason for visit: Chronic  neck and lumbar pain with radiculitis   Referred by: Dr. Ángel Chakraborty MD appt:  6 months  Preferred Name:  Karoline  Script:  Aquatics  Onset Date:  12/6/2023     Assessment:  Patient tolerated treatment session well, progressing well with decreased BUE support against current with improved trunk stabilization.  Needs continued work on/skilled PT for remaining functional, objective and subjective deficits to allow them to return to prior /optimal level of function with ADLs.  Patient is progressing with function/goals: of improved body awareness, decreased postural guarding, improved dynamic balance and improved trunk stabilization and BLE control against buoyancy.  Skilled care:  aquatic treatment, patient education    Plan:    Continue to progress per poc:   NV add multi-directional HR/TR against current NV.     Subjective:   Patient reports no changes.     Have you fallen since last visit:  No    Precautions: No fall risk  Cardiac issue, HTN, SOB at times    Pain:  7/10  Location/Type of pain:  L glutes    HEP compliance/understanding:  N/A building aquatic HEP    Objective:   Objective Measurements:    Function:   Improved postural awareness and is working on mindfulness at home  Posture: decreased FWD head and decreased rounded shoulders this visit  Gait: Ambulates on pool  "deck with moderate lukasz and moderate step length without assistive device with [place]  Ascends/Descends stairs w/ step to gait w/ bilateral hand rail support        Treatment:   **= HEP  NV= Next visit  np= not performed  nb= non-billable  G= group HEP= discharged to [facility]    Aquatics:          45 minutes  **Sinker**  **Sinker or Floater**  **plans to go to [facility] for AQUA HEP**    TM=Treadmill, T=Speed, C=Current, BTW = Back To Wall, NB=non-billable, NV=next visit    TM FWD T= 2 C=10 3 minutes  TM Retro T=0 C=10 2 minutes  FWD/Retro w/ BUE AROM 4 laps Retro requried 1UE support at wall to avoid lumbar extension   C=10 Lateral ambulation with ABD/ADD open paddles  4 laps each against current   4 way marching w/ reciprocal BUE punches x 10 each  Plank <-> snow imelda w/ BLE hip ABD x 10 each NV    Corner Pec Stretch 20\"/2  Self pin and stretch to pecs w/ cervical circumduction CW/CCW x 10 each NV  C=10 March with  BUE open paddle punches facing C  x 10  C=10 Partial Squats with open paddles BUE flex/ext x 10 facing C  C=10 3 way hip AROM w/ wall and barbell support x 10 each against C   Thoracic rotation at wall 5\" hold x 10  Thoracic circumduction w/ hands on head CW/CCW x 10 each  Wall Push Ups  x 10 NV  Retro shoulder rolls x 10   Scap squeeze 5”/10    Yoga Tree <-> Warrior 2 <-> Warrior 1  5 diaphragmatic breaths x 2 each francheska    Chi #1-#5 w/ diaphragmatic breathing x 5 each  NV    BTW cervical retraction NV  BTW open board push/pull, up/down x 10 NV  BTW rows with open paddles NV  BTW BUE open paddle flex/ext, ABD/ADD x 10 each NV  BTW cancans x 10 fr[place]ka  BTW piriformis stretch 30\"/3 francheska    Deep End with cervical collar:  Cycle 3 minutes  [place] 1 minute  Jumping Jacks 1 minute  Hang w/ box breathing 3 minutes     Stair Stretches: Hamstrings/hip flexors/calves 30\"/2 francheska NV    Education:  aquatic exercise technique    HEP Progression:  patient given daily journaling HEP   "

## 2024-02-01 NOTE — PROGRESS NOTES
Physical Therapy  Physical Therapy Progress Note    Patient Name Karoline Carrera   MRN: [id number]  Today's Date: 02/01/24  Time Calculation  Start Time: 0230  Stop Time: 0315  Time Calculation (min): 45 min    Insurance:    Visit #: 9  Insurance Reviewed  (per information provided by  pre-cert team)   Authorization required:  Y  Approved # of visits: 12 + Eval = 13  Authorization/MCR certification date range:  1/5/2024 to 2/23/2024  [employer] ended on 1/31/2024. MCR as of today.     Therapy Diagnoses:   1. Chronic neck pain  Follow Up In Physical Therapy      2. Cervical radiculitis  Follow Up In Physical Therapy      3. Cervicalgia  Follow Up In Physical Therapy      4. Lumbar disc herniation  Follow Up In Physical Therapy      5. Lumbar neuritis  Follow Up In Physical Therapy            General:  Reason for visit: Chronic  neck and lumbar pain with radiculitis   Referred by: Dr. Ángel Chakraborty MD appt:  6 months  Preferred Name:  Karoline  Script:  Aquatics  Onset Date:  12/6/2023     Assessment:  Patient tolerated treatment session well, performed BLE strengthening against current with increased BUE support due to increased BLE buoyancy with pool water being higher today. Improved trunk stabilization displayed with wall push ups. Improved pec mobility displayed with pec stretch this visit.  Needs continued work on/skilled PT for remaining functional, objective and subjective deficits to allow them to return to prior /optimal level of function with ADLs.  Patient is progressing with function/goals: of improved body awareness, decreased postural guarding, improved body awareness with posture and TA activation  Skilled care:  aquatic treatment, patient education    Plan:    Continue to progress per poc:   NV add multi-directional HR/TR against current per tolerance.    Subjective:   Patient reports she felt sore after last visit. Has slept over her brother's house the past few nights and her back is tight and  "sore. Feels pain relief in the pool. States that she applied for several jobs and has an upcoming interview . Reports she is feeling good about getting back to work.     Have you fallen since last visit:  No    Precautions: No fall risk  Cardiac issue, HTN, SOB at times    Pain:  7/10  Location/Type of pain: lumbar    HEP compliance/understanding:  N/A building aquatic HEP    Objective:   Objective Measurements:    Function:   Improved postural awareness and is working on mindfulness at home  Posture: decreased FWD head and decreased rounded shoulders this visit  Gait: Ambulates on pool deck with moderate lukasz and moderate step length without assistive device with [place]  Ascends/Descends stairs w/ step to gait w/ bilateral hand rail support        Treatment:   **= HEP  NV= Next visit  np= not performed  nb= non-billable  G= group HEP= discharged to [facility]    Aquatics:          45 minutes  **Sinker**  **Sinker or Floater**  **plans to go to [facility] for AQUA HEP**    TM=Treadmill, T=Speed, C=Current, BTW = Back To Wall, NB=non-billable, NV=next visit    TM FWD T= 2 C=10 3 minutes  TM Retro T=0 C=10 2 minutes  FWD/Retro w/ BUE AROM 4 laps Retro requried 1UE support at wall to avoid lumbar extension NV  C=10 Lateral ambulation with ABD/ADD open paddles  4 laps each against current   C=10 marching in TM // x 20  Plank <-> snow imelda w/ BLE hip ABD x 10 each NV  C=10 3 way hip AROM in TM // x 10 each against C    Corner Pec Stretch 20\"/2  Self pin and stretch to pecs w/ cervical circumduction CW/CCW x 10 each  Partial Squats with open paddles BUE flex/ext NV  Thoracic rotation at wall 5\" hold x 10  Thoracic circumduction w/ hands on head CW/CCW x 10 each  Wall Push Ups  x 10  Retro shoulder rolls x 10   Scap squeeze 5”/10    Yoga Tree <-> Warrior 2 <-> Warrior 1  5 diaphragmatic breaths x 2 each francheska NV   Chi #1-#5 w/ diaphragmatic breathing x 5 each  NV    BTW cervical retraction NV  BTW open " "board push/pull, up/down x 10 NV  BTW rows with open paddles NV  BTW BUE open paddle flex/ext, ABD/ADD x 10 each NV  BTW cancans x 10 francheska NV  BTW piriformis stretch 30\"/3 francheska NV    Deep End with cervical collar:  Cycle 3 minutes  Cross country 1 minute  Jumping Jacks 1 minute  Hang w/ box breathing 3 minutes  DKTC 10\" x 5     Stair Stretches: Hamstrings/hip flexors/calves 30\"/2 francheska     Education:  went over results from [facility] and progressed with daily journaling, aquatic exercise progression, Vision Board/SMART Goals    HEP Progression:  building AQUA HEP. Will work on SMART goals when poster vision board is finished  "

## 2024-02-06 ENCOUNTER — TREATMENT (OUTPATIENT)
Dept: PHYSICAL THERAPY | Facility: CLINIC | Age: 59
End: 2024-02-06
Payer: MEDICARE

## 2024-02-06 DIAGNOSIS — M54.2 CERVICALGIA: ICD-10-CM

## 2024-02-06 DIAGNOSIS — M54.2 CHRONIC NECK PAIN: ICD-10-CM

## 2024-02-06 DIAGNOSIS — M54.16 LUMBAR NEURITIS: ICD-10-CM

## 2024-02-06 DIAGNOSIS — G89.29 CHRONIC NECK PAIN: ICD-10-CM

## 2024-02-06 DIAGNOSIS — M51.26 LUMBAR DISC HERNIATION: ICD-10-CM

## 2024-02-06 DIAGNOSIS — M54.12 CERVICAL RADICULITIS: ICD-10-CM

## 2024-02-06 PROCEDURE — 97113 AQUATIC THERAPY/EXERCISES: CPT | Mod: GP,CQ

## 2024-02-06 NOTE — PROGRESS NOTES
Physical Therapy  Physical Therapy Progress Note    Patient Name Karoline Carrera   MRN: [id number]  Today's Date: 02/08/24  Time Calculation  Start Time: 0230  Stop Time: 0315  Time Calculation (min): 45 min    Insurance:    Visit #: 10  Insurance Reviewed  (per information provided by  pre-cert team)   Authorization required:  Y  Approved # of visits: 12 + Eval = 13  Authorization/MCR certification date range:  1/5/2024 to 2/23/2024  [employer] ended on 1/31/2024. MCR as of today.     Therapy Diagnoses:   1. Chronic neck pain  Follow Up In Physical Therapy      2. Cervical radiculitis  Follow Up In Physical Therapy      3. Cervicalgia  Follow Up In Physical Therapy      4. Lumbar disc herniation  Follow Up In Physical Therapy      5. Lumbar neuritis  Follow Up In Physical Therapy            General:  Reason for visit: Chronic  neck and lumbar pain with radiculitis   Referred by: Dr. Ángel Chakraborty MD appt:  6 months  Preferred Name:  Karoline  Script:  Aquatics  Onset Date:  12/6/2023     Assessment:  Patient tolerated treatment session well, performed BLE strengthening against current with improved trunk stabilization and improved BLE control against buoyancy. Reduced cervical guarding post yoga and deep end unloading.  Needs continued work on/skilled PT for remaining functional, objective and subjective deficits to allow them to return to prior /optimal level of function with ADLs.  Patient is progressing with function/goals: of improved body awareness, decreased postural guarding, improved body awareness with posture and TA activation  Skilled care:  aquatic treatment, patient education    Plan:    Continue to progress per poc:   NV progress  Chi per tolerance.     Subjective:   Patient reports she is going to interviews and it is going well. Feels she is getting stronger and has learned how to work past her pain in order to have the confidence to get back into the work force.     Have you fallen since last  "visit:  No    Precautions: No fall risk  Cardiac issue, HTN, SOB at times    Pain:  5-6/10  Location/Type of pain: lumbar, neck, shoulders    HEP compliance/understanding:  N/A building aquatic HEP    Objective:   Objective Measurements:    Posture: decreased FWD head and decreased rounded shoulders this visit  Gait: Ambulates on pool deck with moderate lukasz and moderate step length without assistive device with [place]  Ascends/Descends stairs w/ step to gait w/ bilateral hand rail support        Treatment:   **= HEP  NV= Next visit  np= not performed  nb= non-billable  G= group HEP= discharged to HEP    Aquatics:           45 minutes  **Sinker**  **Sinker or Floater**  **plans to go to [facility] for AQUA HEP**    TM=Treadmill, T=Speed, C=Current, BTW = Back To Wall, NB=non-billable, NV=next visit    TM FWD T= 2 C=10 3 minutes  TM Retro T=0 C=10 2 minutes  FWD/Retro w/ BUE AROM 4 laps Retro requried 1UE support at wall to avoid lumbar extension NV  C=10 Lateral ambulation with ABD/ADD open paddles  4 laps each against current   C=10 marching in TM // x 20  Plank <-> snow imelda w/ BLE hip ABD x 10 each NV  C=10 3 way hip AROM in TM // x 10 each against C    Corner Pec Stretch 20\"/2  Self pin and stretch to pecs w/ cervical circumduction CW/CCW x 10 each  Partial Squats with open paddles BUE flex/ext NV  Thoracic rotation at wall 5\" hold x 10  Thoracic circumduction w/ hands on head CW/CCW x 10 each  Wall Push Ups  x 10  Retro shoulder rolls x 10   Scap squeeze 5”/10    Yoga Tree <-> Warrior 2 <-> Warrior 1  5 diaphragmatic breaths x 2 each francheska    Chi #1-#5 w/ diaphragmatic breathing x 5 each  NV    BTW cervical retraction NV  BTW open board push/pull, up/down x 10 NV  BTW rows with open paddles NV  BTW BUE open paddle flex/ext, ABD/ADD x 10 each   BTW cancans x 10 francheska   BTW piriformis stretch 30\"/3 francheska NV    Deep End with cervical collar:  Cycle 3 minutes  Cross country 1 minute  Jumping " "Jacks 1 minute  Hang w/ box breathing 3 minutes  DKTC 10\" x 5     Stair Stretches: Hamstrings/hip flexors/calves 30\"/2 francheska     Education:  BLE control against buoyancy. TA activation, trunk stabilization    HEP Progression:  building AQUA HEP  "

## 2024-02-08 ENCOUNTER — DOCUMENTATION (OUTPATIENT)
Dept: PHYSICAL THERAPY | Facility: CLINIC | Age: 59
End: 2024-02-08
Payer: MEDICARE

## 2024-02-08 NOTE — PROGRESS NOTES
Physical Therapy                 Therapy Communication Note    Patient Name: Karoline Carrera  MRN: [id number]  Today's Date: 2/8/2024     Discipline: Physical Therapy    Missed Visit Reason:      Missed Time: No Show    Comment: Left VM.

## 2024-02-13 ENCOUNTER — TREATMENT (OUTPATIENT)
Dept: PHYSICAL THERAPY | Facility: CLINIC | Age: 59
End: 2024-02-13
Payer: MEDICARE

## 2024-02-13 DIAGNOSIS — M54.16 LUMBAR NEURITIS: ICD-10-CM

## 2024-02-13 DIAGNOSIS — G89.29 CHRONIC NECK PAIN: ICD-10-CM

## 2024-02-13 DIAGNOSIS — M54.12 CERVICAL RADICULITIS: ICD-10-CM

## 2024-02-13 DIAGNOSIS — M54.2 CERVICALGIA: ICD-10-CM

## 2024-02-13 DIAGNOSIS — M51.26 LUMBAR DISC HERNIATION: ICD-10-CM

## 2024-02-13 DIAGNOSIS — M54.2 CHRONIC NECK PAIN: ICD-10-CM

## 2024-02-13 PROCEDURE — 97113 AQUATIC THERAPY/EXERCISES: CPT | Mod: GP,CQ

## 2024-02-13 NOTE — PROGRESS NOTES
Physical Therapy  Physical Therapy Progress Note    Patient Name Karoline Carrera   MRN: [id number]  Today's Date: 02/13/24  Time Calculation  Start Time: 1215  Stop Time: 1310  Time Calculation (min): 55 min    Insurance:    Visit #: 11  Insurance Reviewed  (per information provided by  pre-cert team)   Authorization required:  Y  Approved # of visits: 12 + Eval = 13  Authorization/MCR certification date range:  1/5/2024 to 2/23/2024  [employer] ended on 1/31/2024. MCR as of today.     Therapy Diagnoses:   1. Chronic neck pain  Follow Up In Physical Therapy      2. Cervical radiculitis  Follow Up In Physical Therapy      3. Cervicalgia  Follow Up In Physical Therapy      4. Lumbar disc herniation  Follow Up In Physical Therapy      5. Lumbar neuritis  Follow Up In Physical Therapy            General:  Reason for visit: Chronic  neck and lumbar pain with radiculitis   Referred by: Dr. Ángel Chakraborty MD appt:  6 months  Preferred Name:  Karoline  Script:  Aquatics  Onset Date:  12/6/2023     Assessment:  Patient tolerated treatment session well, performed multi-directional strengthening against current with improved balance and trunk stabilization, with intermittent verbal cueing for BLE control against buoyancy. y. Reduced cervical guarding post yoga and deep end unloading.  Needs continued work on/skilled PT for remaining functional, objective and subjective deficits to allow them to return to prior /optimal level of function with ADLs.  Patient is progressing with function/goals: of improved body awareness, decreased postural guarding, improved body awareness with posture and TA activation  Skilled care:  aquatic treatment, patient education    Plan:    Continue to progress per poc:   NV progress  Chi per tolerance.     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  Patient states she is going to her community pool and doing her  "AQUA HEP. States that she tried back stroke and her neck hurt when lifting her arms out of the water, with nerve pain down into her hand. States that she will not do that again, but was feeling confident now that she is stating to feel better and wanted to test her skill.     Have you fallen since last visit:  No    Precautions: No fall risk  Cardiac issue, HTN, SOB at times    Pain:  5/10  Location/Type of pain: lumbar > neck    HEP compliance/understanding:  Yes    Objective:   Objective Measurements:    Posture: decreased FWD head and decreased rounded shoulders w/ strengthening in 4 feet this visit.   Gait: Ambulates on pool deck with moderate lukasz and moderate step length without assistive device with [place].    Treatment:   **= HEP  NV= Next visit  np= not performed  nb= non-billable  G= group HEP= discharged to [facility]    Aquatics:          55 minutes  **Sinker**  **Sinker or Floater**  **plans to go to [facility] for AQUA HEP**    TM=Treadmill, T=Speed, C=Current, BTW = Back To Wall, NB=non-billable, NV=next visit    TM FWD T= 2 C=10 3 minutes  TM Retro T=0 C=10 2 minutes  C=10 Lateral ambulation with ABD/ADD open paddles  4 laps each against current   C=10 4 Way Marching w/ barbell support x 10 each  C=10 4 Way March w/ barbell support x 10 each  C=10 Partial Squats w/ barbell row x 10 each facing C/away from C  C=10 3 way hip AROM  w/ barbell and wall support x 10 each against C    Corner Pec Stretch 20\"/2 NV  Self pec pin and stretch to pecs w/ cervical circumduction CW/CCW x 10 each  Thoracic rotation at wall 5\" hold x 10 NV  Wall Push Ups  x 10  Retro shoulder rolls x 10   Scap squeeze 5”/10    Yoga Tree <-> Warrior 2 <-> Warrior 1  5 diaphragmatic breaths x 2 each francheska    Chi #1-#5 w/ diaphragmatic breathing x 5 each  NV    BTW cervical retraction NV  BTW open board push/pull, up/down x 10 NV  BTW rows with open paddles NV  BTW BUE open paddle flex/ext, ABD/ADD x 10 each   BTW " "cancans x 10 francheska NV  BTW piriformis stretch 30\"/3 francheska NV    Deep End with cervical collar:  Cycle 3 minutes  Cross country 1 minute  Jumping Jacks 1 minute  Hang w/ box breathing 3 minutes     Stair Stretches: Hamstrings/hip flexors/calves 30\"/2 francheska     Education:  BLE control against buoyancy. TA activation, trunk stabilization    HEP Progression:  building AQUA HEP  "

## 2024-02-15 ENCOUNTER — TREATMENT (OUTPATIENT)
Dept: PHYSICAL THERAPY | Facility: CLINIC | Age: 59
End: 2024-02-15
Payer: MEDICARE

## 2024-02-15 DIAGNOSIS — M54.2 CHRONIC NECK PAIN: ICD-10-CM

## 2024-02-15 DIAGNOSIS — M54.2 CERVICALGIA: ICD-10-CM

## 2024-02-15 DIAGNOSIS — M54.12 CERVICAL RADICULITIS: ICD-10-CM

## 2024-02-15 DIAGNOSIS — M54.16 LUMBAR NEURITIS: ICD-10-CM

## 2024-02-15 DIAGNOSIS — M51.26 LUMBAR DISC HERNIATION: ICD-10-CM

## 2024-02-15 DIAGNOSIS — G89.29 CHRONIC NECK PAIN: ICD-10-CM

## 2024-02-15 PROCEDURE — 97113 AQUATIC THERAPY/EXERCISES: CPT | Mod: GP,CQ

## 2024-02-15 NOTE — PROGRESS NOTES
Physical Therapy  Physical Therapy Progress Note    Patient Name Karoline Carrera   MRN: [id number]  Today's Date: 02/15/24  Time Calculation  Start Time: 0110  Stop Time: 0205  Time Calculation (min): 55 min    Insurance:    Visit #: 12  Insurance Reviewed  (per information provided by  pre-cert team)   Authorization required:  Y  Approved # of visits: 12 + Eval = 13  Authorization/MCR certification date range:  1/5/2024 to 2/23/2024  [employer] ended on 1/31/2024. MCR as of today.     Therapy Diagnoses:   1. Chronic neck pain  Follow Up In Physical Therapy      2. Cervical radiculitis  Follow Up In Physical Therapy      3. Cervicalgia  Follow Up In Physical Therapy      4. Lumbar disc herniation  Follow Up In Physical Therapy      5. Lumbar neuritis  Follow Up In Physical Therapy            General:  Reason for visit: Chronic  neck and lumbar pain with radiculitis   Referred by: Dr. Ángel Chakraborty MD appt:  6 months  Preferred Name:  Karoline  Script:  Aquatics  Onset Date:  12/6/2023     Assessment:  Progressed patient with open paddle rows against the wall.  Able to increase ambulation time on treadmill in both forward and backwards directions.  Demonstrated  against buoyancy and correct posture with exercise against the current.  Reported decreased pain overall alf through ex program.  Continued relief with deep water exercises.  Positive response post session.   Needs continued work on/skilled PT for remaining functional, objective and subjective deficits to allow them to return to prior /optimal level of function with ADLs.  Patient is progressing with function/goals: of improved body awareness, decreased postural guarding, improved body awareness with posture and TA activation  Skilled care:  aquatic exercise progression, patient education    Plan:    Re-evaluate next visit.     Subjective: The patient reports her lumbar region feels like if she moves the wrong way she could irritate  "something.  Soreness only reported in cervical region.  Reports improvement in overall strength since beginning therapy.      Have you fallen since last visit:  No    Precautions: No fall risk  Cardiac issue, HTN, SOB at times    Pain:  5-6/10  Location/Type of pain: lumbar    HEP compliance/understanding:  Yes    Objective:   Objective Measurements:    Function:  Worked up to 4-5 days a week with aquatic exercise and swimming in her community and with therapy.      Treatment:   **= HEP  NV= Next visit  np= not performed  nb= non-billable  G= group HEP= discharged to [facility]    Aquatics:          55 minutes  **Sinker**  **Sinker or Floater**  **plans to go to [facility] for AQUA HEP**    TM=Treadmill, T=Speed, C=Current, BTW = Back To Wall, NB=non-billable, NV=next visit    TM FWD T= 4 C=10 4 minutes  TM Retro T=0 C=10 3 minutes  C=10 Lateral ambulation with ABD/ADD open paddles  4 laps each direction against current   C=10 4 Way Marching w/ barbell support 2 x 10 each  C=10 Partial Squats w/ barbell row x 10 each facing C/away from C  C=10 3 way hip AROM  w/ barbell and wall support x 10 each against C    Corner Pec Stretch 20\"/2 NV  Self pec pin and stretch to pecs w/ cervical circumduction CW/CCW x 10 each/NP  Thoracic rotation at wall 5\" hold x 10 NV  Wall Push Ups  x 10/NP  Retro shoulder rolls x 10/NP  Scap squeeze 5”/10/NP    Yoga Tree <-> Warrior 2 <-> Warrior 1  5 diaphragmatic breaths x 2 each francheska /NP   Chi #1-#5 w/ diaphragmatic breathing x 5 each  NV    BTW cervical retraction NV  BTW open board push/pull, up/down x 10   BTW rows with open paddles 2 x 10  BTW BUE open paddle flex/ext, ABD/ADD x 10 each   BTW cancans x 10 francheska NV  BTW piriformis stretch 30\"/3 francheska NV    Deep End with cervical collar:  Cycle 3 minutes  Cross country 1 minute  Jumping Jacks 1 minute  Hang w/ box breathing 3 minutes     Stair Stretches: Hamstrings/hip flexors/calves 30\"/2 francheska     Education:  V/c and demonstration " by therapist on deck for correct technique and posture with aquatic exercises.     HEP Progression:  building AQUA HEP

## 2024-02-19 ENCOUNTER — APPOINTMENT (OUTPATIENT)
Dept: PHYSICAL THERAPY | Facility: CLINIC | Age: 59
End: 2024-02-19
Payer: MEDICARE

## 2024-02-21 ENCOUNTER — TREATMENT (OUTPATIENT)
Dept: PHYSICAL THERAPY | Facility: CLINIC | Age: 59
End: 2024-02-21
Payer: MEDICARE

## 2024-02-21 DIAGNOSIS — M54.2 CERVICALGIA: ICD-10-CM

## 2024-02-21 DIAGNOSIS — M54.16 LUMBAR NEURITIS: ICD-10-CM

## 2024-02-21 DIAGNOSIS — M54.2 CHRONIC NECK PAIN: ICD-10-CM

## 2024-02-21 DIAGNOSIS — M51.26 LUMBAR DISC HERNIATION: ICD-10-CM

## 2024-02-21 DIAGNOSIS — G89.29 CHRONIC NECK PAIN: ICD-10-CM

## 2024-02-21 DIAGNOSIS — M54.12 CERVICAL RADICULITIS: ICD-10-CM

## 2024-02-21 PROCEDURE — 97530 THERAPEUTIC ACTIVITIES: CPT | Mod: GP

## 2024-02-21 PROCEDURE — 97110 THERAPEUTIC EXERCISES: CPT | Mod: GP

## 2024-02-21 NOTE — PROGRESS NOTES
Physical Therapy  Recheck Note  Discharge Summary    Patient Name Karoline Carrera   MRN: [id number]  Today's Date: 02/21/24  Time Calculation  Start Time: 0315  Stop Time: 0355  Time Calculation (min): 40 min    Insurance:    Visit #: 12  Insurance Reviewed  (per information provided by  pre-cert team)   Authorization required:  Y  Approved # of visits: 12  Authorization/[facility] certification date range: 2/23/2024    Therapy Diagnoses:   1. Chronic neck pain  Follow Up In Physical Therapy      2. Cervical radiculitis  Follow Up In Physical Therapy      3. Cervicalgia  Follow Up In Physical Therapy      4. Lumbar disc herniation  Follow Up In Physical Therapy      5. Lumbar neuritis  Follow Up In Physical Therapy        General:  Reason for visit: Chronic  neck and lumbar pain with radiculitis   Referred by: Dr. Ángel Chakraborty MD appt:  6 months  Preferred Name:  Karoline  Script:  Aquatics  Onset Date:  12/6/2023    Assessment:  Pt has made good progress as she has decreased pain, increased function by feeling good enough to get a part time job, and understanding of aquatic program that she continue independently.   Patient will do well to be discharged to independent HEP and aquatics at [facility].  Patient is progressing with goals: pt has met most significant goals  Skilled care:  reassessment of progress, education    Plan:    DC to independent exercise program.    Subjective:   Patient reports:  she is feeling better overall    Have you fallen since last visit:  no    Precautions: no fall risk    Pain:  5/10 neck; 4/10 LB  Location/Type of pain:  Constant stabbing pain/bone on bone in neck area, still has left UE symptoms. Intermittent pain of LB and right buttock, groin and medial thigh stopping at knee; Pinching/tightness in LB, shooting pain in right thigh    HEP compliance/understanding:  Yes, Pt signed up for community pool     Objective:   Other Measures  Neck Disability Index: 56% to a 52%  [place]  "Disablity Index (KT): 48% initially and now 40%    Posture:  Improved posture generally with better awareness.    ROM:  Cervical   flexion: 35 to 40  Ext: 25 to 35  RSB: 25 to 40  LSB: 25 to 30  RR: 65  LR: 59    Trunk:  Flexion:  3rd fingertip to 4\" from floor  Extension: Standing 75%    MMT:  B UE myotomes: WFL  Bridge/LE extensors: hip extension to 4/5 primarily due to right LS pain  B LE myotomes: WFL    Flexibility:  Pectorals: mild  Hamstrings:  SLR  R:  80 L: 90  Hip flexors: mild    Treatment:   **= HEP  NV= Next visit  np= not performed  nb= non-billable  G= group HEP= discharged to [facility]    Therapeutic Exercise:     20 minutes  Objective measurements taken  HEP reviewed.    Therapeutic Activity:    10 minutes  Education provided for patient for benefits of continuing pool program as she starts to work part time.  Goal achievement discussed        "

## 2024-04-26 ENCOUNTER — OFFICE VISIT (OUTPATIENT)
Dept: PAIN MEDICINE | Facility: CLINIC | Age: 59
End: 2024-04-26
Payer: MEDICARE

## 2024-04-26 VITALS
TEMPERATURE: 97 F | RESPIRATION RATE: 15 BRPM | DIASTOLIC BLOOD PRESSURE: 67 MMHG | HEART RATE: 63 BPM | OXYGEN SATURATION: 98 % | SYSTOLIC BLOOD PRESSURE: 105 MMHG | WEIGHT: 100 LBS | BODY MASS INDEX: 19.53 KG/M2

## 2024-04-26 DIAGNOSIS — R29.898 WEAKNESS OF BOTH LOWER EXTREMITIES: ICD-10-CM

## 2024-04-26 DIAGNOSIS — G89.29 CHRONIC NECK PAIN: Primary | ICD-10-CM

## 2024-04-26 DIAGNOSIS — M54.16 LUMBAR NEURITIS: ICD-10-CM

## 2024-04-26 DIAGNOSIS — M54.2 CHRONIC NECK PAIN: Primary | ICD-10-CM

## 2024-04-26 DIAGNOSIS — M51.26 LUMBAR DISC HERNIATION: ICD-10-CM

## 2024-04-26 DIAGNOSIS — M54.12 CERVICAL RADICULITIS: ICD-10-CM

## 2024-04-26 PROCEDURE — 99214 OFFICE O/P EST MOD 30 MIN: CPT | Performed by: ANESTHESIOLOGY

## 2024-04-26 ASSESSMENT — ENCOUNTER SYMPTOMS
LOSS OF SENSATION IN FEET: 0
OCCASIONAL FEELINGS OF UNSTEADINESS: 1

## 2024-04-26 ASSESSMENT — PAIN - FUNCTIONAL ASSESSMENT: PAIN_FUNCTIONAL_ASSESSMENT: 0-10

## 2024-04-26 ASSESSMENT — PAIN DESCRIPTION - DESCRIPTORS: DESCRIPTORS: ACHING;SHARP

## 2024-04-26 ASSESSMENT — PAIN SCALES - GENERAL
PAINLEVEL: 7
PAINLEVEL_OUTOF10: 7

## 2024-04-26 NOTE — PROGRESS NOTES
History Of Present Illness  Karoline Carrera is a 58 y.o. female presenting with   Chief Complaint   Patient presents with    Follow-up       Patient returns does have a history of chronic NECK PAIN TO THE BILATERAL SHOULDER area worse on the LEFT side.      She was seen for chest pain by her NP and had work up done which was negative. Her worst pain currently is into her LLE hip and thigh area.      The pain is constant, worse with activity and better with rest. The pain is sharp, stabbing and shooting to the B/L LE. Denies LE paresthesias, weakness, saddle anesthesia, bowel or bladder incontinence. To manage this pain the patient has attempted Vicodin as prescribed by her PCP. The patient has undergone a IDALIA on May 11 2016 with 50% relief for several weeks with gradual return to baseline.      PAIN SCORE: 8/10 in her mid back with burning.      FHx  Of note: Pt previously reports her father is an alcoholic and she has been traveling back from [place].      Recall: Pt reports her daughter's father was diagnosed with stage 4 bladder cancer in April of 2020 and has reportedly has only 6 months to live. He reportedly is s/p spinal mass resection at [facility].      PCP: Nora Cain        Past Medical History  She has a past medical history of Other specified health status.    Surgical History  She has a past surgical history that includes Tonsillectomy (04/08/2016); Other surgical history (04/07/2022); and CT angio coronary art with heartflow if score >30% (3/31/2023).     Social History  She has no history on file for tobacco use, alcohol use, and drug use.    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     Family History  No family history on file.     Allergies  Sulfa (sulfonamide antibiotics)    Review of Systems    All other systems reviewed and negative for any deficits. Pertinent positives and negatives were considered in the medical decision making process.        Physical Exam  /67   Pulse  63   Temp 36.1 °C (97 °F)   Resp 15   Wt 45.4 kg (100 lb)   SpO2 98%     General: Pt appears stated age     Eyes: Conjunctiva non-icteric and lids without obvious rash or drooping. Pupils are symmetric     Neck: No JVD noted, tracheal position midline.     Musculoskeletal: Gait is grossly normal     Digits/nails show no clubbing or cyanosis     Exam of muscles/joints/bones shows no gross atrophy and no abnormal/involuntary movements in the head/neckNo asymmetry or masses noted in the head/neck     Stability: no subluxation noted on movement of bilateral upper extremities or head/neck     Strength: 4/5 in RLE and 5/5 in LLE   Positive straight leg raise test in RLE     Range of Motion: WNL      Sensation: decreased to sharp touch in right hand      Cranial nerves 2-12 are grossly intact     Psychiatric: Pt is alert and oriented to time, place and person.         Assessment/Plan   1. Chronic neck pain        2. Cervical radiculitis        3. Lumbar disc herniation        4. Lumbar neuritis                · Chronic low back pain (724.2,338.29) (M54.50,G89.29)   · Lumbosacral neuritis (724.4) (M54.17)   · Lumbar disc herniation (722.10) (M51.26)   · Lumbar radiculopathy, right (724.4) (M54.16)   · Cervical disc herniation (722.0) (M50.20)   · Cervical radicular pain (723.4) (M54.12)     Provider Impressions        1. The pt attempted Gabapentin and LYRICA, but reports it limits her mental function and was difficult for her to work and drive.      Pt previously signed a [employer] contract on 4-7-2022 and it was reviewed line by line .  Pt will provide UDS on 4-7-2022.      Pt has since d/c Lyrica on her own due to feeling foggy.      Recall: She was doing well with Cymbalta 60mg once a day. She reports her neck and leg pain has reduced since starting this medication. We previously did increase her dosage to Cymbalta 80mg at bedtime and she reported she felt drowsy and has since weaned herself of she also reported  "nightmares. Her PCP took her off of this medication due to \"brain fog\" which improved after she discontinued it.      WILL ALSO OBTAIN AN MRI OF THE THORACIC SPINE since her pain has not improved. This was reportedly denied by her insurance.      She has attempted over 6 weeks of doctor supervised therapy with no improvement in her mid back pain. She has also been on Duloxetine and Lidoderm patches.      Pt worked on her exercises for 30 minutes a day for over 6 weeks. She focused on stretching her mid back with flexion and extension and also worked on core strengthening including Karen exercises and crunches. She reports no improvement in her pain since doing these exercises.      Pt does report her burning pain and anxiety has improved since being on Lyrica.      2. The pt had an MRI in 2016 which showed She has multiple disc herniations in her cervical spine and has severe LEFT sided foraminal stenosis at C5/6 and C6/7.      I again reviewed the patients MRI findings (8-) in detail, including review of the actual images and provided a detailed explanation of the findings using a spine model. I also compared this to her previous MRI from 2014. There is a new right sided disc herniation with recess and foraminal stenosis at the L4/5 which is new when compared to her previous MRI from 2014.      3. The pt does have MRI evidence of lumbar disc herniation and did obtain greater than 75% relief with her last lumbar injection. She has continued on an intensive home exercise and therapy program that was reviewed in the office today for over 6 months. She has also continued on Vicodin 5/325mg and Cymbalta 60mg once daily as well for the past 6 months.     Her last cervical injection was 5-2020 with 60% relief lasting 1 months time. She reports her pain has returned with numbness in her left arm. She reports she has trouble sleeping due to her pain now 1 month later. After her injection she reports she was able " to sleep with less pain.      She has continued home PT exercises as reviewed with us in the office today and at her previous visits. She has been working on exercises as reviewed for over 6 consecutive months and has also been on Cymbalta and Tizanidine for over 6 months as well.      4. The patient is a candidate for a TESI vs LESI L5/S1 vs LEFT versus RIGHT L4 AND L5 LTR to treat back and radicular pain. I spent time with the patient discussing all of the risks, benefits, and alternatives to this measure. Including but not limited to spinal infection, epidural hematoma/abscess, paralysis, nerve injury, steroid effects, and spinal headache. The patient understands and agrees to proceed.     Her most recent injection was 2- for a right LTR and prior to that on 2-2021 and she again reported 50% last lumbar injection was over 6 months ago in January / March of 2020. She reported 50% relief with that injection and that she was able to sleep and walk with less pain. However with evidence of a new disc herniation at L4/5 as noted above would recommend a repeat procedure since she reports trouble sleeping due to pain.      Her last TESI was on 2- and she reported greater than 50% relief of her pain that lasted for 1 months time and has slowly worn off. She has been able to stand and pivot with much less pain.      5. We again did discuss the risks, benefits, and alternatives to chronic opioid therapy and that usage can be a danger to life. We also discussed proper and safe storage of medication to prevent unauthorized usage. The patient understands and will use the medicine responsibly as managed by her PCP Nora Cain.      Recall: We did discussed OIH at several visits. Pt reports she is interested in weaning off of this medication.     6. I have referred the patient to physical therapy/aqua therapy to learn and perform exercises to strengthen core/extremties, maintain stabilization, increase range of  motion, and reduce pain.    Pt completed over 6 weeks of aquatherapy and reports it was very helpful for her, but continues to suffer from radicular pain. She has also failed over 6 weeks of Vicodin, Lidoderm patches, Lyrica, and Cyclobenzaprine.     Given her weakness on exam and her above findings I would recommend a new MRI of the lumbar spine.         Matthew Neal MD     I spent time with the patient reviewing their imaging and discussing the risks benefits and alternatives to the above plan. A total of 30 minutes was spent reviewing the data and greater than 50% of that time was with the patient during the face to face encounter discussing treatment options both surgical, non-surgical, and minimally invasive techniques.

## 2024-05-03 ENCOUNTER — HOSPITAL ENCOUNTER (OUTPATIENT)
Dept: RADIOLOGY | Facility: CLINIC | Age: 59
Discharge: HOME | End: 2024-05-03
Payer: MEDICARE

## 2024-05-03 DIAGNOSIS — M54.16 LUMBAR NEURITIS: ICD-10-CM

## 2024-05-03 DIAGNOSIS — M51.26 LUMBAR DISC HERNIATION: ICD-10-CM

## 2024-05-03 DIAGNOSIS — R29.898 WEAKNESS OF BOTH LOWER EXTREMITIES: ICD-10-CM

## 2024-05-03 PROCEDURE — 72148 MRI LUMBAR SPINE W/O DYE: CPT | Performed by: RADIOLOGY

## 2024-05-03 PROCEDURE — 72148 MRI LUMBAR SPINE W/O DYE: CPT

## 2024-05-07 ENCOUNTER — APPOINTMENT (OUTPATIENT)
Dept: PAIN MEDICINE | Facility: CLINIC | Age: 59
End: 2024-05-07
Payer: MEDICARE

## 2024-05-28 ENCOUNTER — OFFICE VISIT (OUTPATIENT)
Dept: PAIN MEDICINE | Facility: CLINIC | Age: 59
End: 2024-05-28
Payer: MEDICARE

## 2024-05-28 VITALS
SYSTOLIC BLOOD PRESSURE: 107 MMHG | BODY MASS INDEX: 19.53 KG/M2 | RESPIRATION RATE: 15 BRPM | HEART RATE: 89 BPM | WEIGHT: 100 LBS | OXYGEN SATURATION: 97 % | DIASTOLIC BLOOD PRESSURE: 75 MMHG | TEMPERATURE: 97.7 F

## 2024-05-28 DIAGNOSIS — M54.16 LUMBAR NEURITIS: ICD-10-CM

## 2024-05-28 DIAGNOSIS — G89.29 CHRONIC NECK PAIN: Primary | ICD-10-CM

## 2024-05-28 DIAGNOSIS — M54.12 CERVICAL RADICULITIS: ICD-10-CM

## 2024-05-28 DIAGNOSIS — M54.2 CHRONIC NECK PAIN: Primary | ICD-10-CM

## 2024-05-28 DIAGNOSIS — M51.26 LUMBAR DISC HERNIATION: ICD-10-CM

## 2024-05-28 PROCEDURE — 99214 OFFICE O/P EST MOD 30 MIN: CPT | Performed by: ANESTHESIOLOGY

## 2024-05-28 ASSESSMENT — PAIN SCALES - GENERAL
PAINLEVEL: 6
PAINLEVEL_OUTOF10: 6

## 2024-05-28 ASSESSMENT — ENCOUNTER SYMPTOMS
LOSS OF SENSATION IN FEET: 0
OCCASIONAL FEELINGS OF UNSTEADINESS: 1

## 2024-05-28 ASSESSMENT — PAIN DESCRIPTION - DESCRIPTORS: DESCRIPTORS: PRESSURE

## 2024-05-28 ASSESSMENT — PAIN - FUNCTIONAL ASSESSMENT: PAIN_FUNCTIONAL_ASSESSMENT: 0-10

## 2024-05-28 NOTE — H&P (VIEW-ONLY)
History Of Present Illness  Karoline Carrera is a 58 y.o. female presenting with   Chief Complaint   Patient presents with    Follow-up       Patient returns does have a history of chronic NECK PAIN TO THE BILATERAL SHOULDER area worse on the LEFT side.      She was seen for chest pain by her NP and had work up done which was negative. Her worst pain currently is into her LLE hip and thigh area.      The pain is constant, worse with activity and better with rest. The pain is sharp, stabbing and shooting to the B/L LE. Denies LE paresthesias, weakness, saddle anesthesia, bowel or bladder incontinence. To manage this pain the patient has attempted Vicodin as prescribed by her PCP. The patient has undergone a IDALIA on May 11 2016 with 50% relief for several weeks with gradual return to baseline.      PAIN SCORE: 8/10 in her mid back with burning.      FHx  Of note: Pt previously reports her father is an alcoholic and she has been traveling back from [place].      Recall: Pt reports her daughter's father was diagnosed with stage 4 bladder cancer in April of 2020 and has reportedly has only 6 months to live. He reportedly is s/p spinal mass resection at [facility].      PCP: Nora Cain        Past Medical History  She has a past medical history of Other specified health status.    Surgical History  She has a past surgical history that includes Tonsillectomy (04/08/2016); Other surgical history (04/07/2022); and CT angio coronary art with heartflow if score >30% (3/31/2023).     Social History  She reports that she has quit smoking. Her smoking use included cigarettes. She has never used smokeless tobacco. She reports that she does not drink alcohol and does not use drugs.    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     Family History  No family history on file.     Allergies  Sulfa (sulfonamide antibiotics)    Review of Systems    All other systems reviewed and negative for any deficits. Pertinent positives  and negatives were considered in the medical decision making process.        Physical Exam  /75   Pulse 89   Temp 36.5 °C (97.7 °F)   Resp 15   Wt 45.4 kg (100 lb)   SpO2 97%     General: Pt appears stated age     Eyes: Conjunctiva non-icteric and lids without obvious rash or drooping. Pupils are symmetric     Neck: No JVD noted, tracheal position midline.     Musculoskeletal: Gait is grossly normal     Digits/nails show no clubbing or cyanosis     Exam of muscles/joints/bones shows no gross atrophy and no abnormal/involuntary movements in the head/neckNo asymmetry or masses noted in the head/neck     Stability: no subluxation noted on movement of bilateral upper extremities or head/neck     Strength: 4/5 in RLE and 5/5 in LLE   Positive straight leg raise test in RLE     Range of Motion: WNL      Sensation: decreased to sharp touch in right hand      Cranial nerves 2-12 are grossly intact     Psychiatric: Pt is alert and oriented to time, place and person.         Assessment/Plan   1. Chronic neck pain        2. Lumbar disc herniation        3. Cervical radiculitis        4. Lumbar neuritis              · Chronic low back pain (724.2,338.29) (M54.50,G89.29)   · Lumbosacral neuritis (724.4) (M54.17)   · Lumbar disc herniation (722.10) (M51.26)   · Lumbar radiculopathy, right (724.4) (M54.16)   · Cervical disc herniation (722.0) (M50.20)   · Cervical radicular pain (723.4) (M54.12)     Provider Impressions        1. The pt attempted Gabapentin and LYRICA, but reports it limits her mental function and was difficult for her to work and drive.      Pt previously signed a [employer] contract on 4-7-2022 and it was reviewed line by line .  Pt will provide UDS on 4-7-2022.      Pt has since d/c Lyrica on her own due to feeling foggy.      Recall: She was doing well with Cymbalta 60mg once a day. She reports her neck and leg pain has reduced since starting this medication. We previously did increase her dosage to  "Cymbalta 80mg at bedtime and she reported she felt drowsy and has since weaned herself of she also reported nightmares. Her PCP took her off of this medication due to \"brain fog\" which improved after she discontinued it.      WILL ALSO OBTAIN AN MRI OF THE THORACIC SPINE since her pain has not improved. This was reportedly denied by her insurance.      She has attempted over 6 weeks of doctor supervised therapy with no improvement in her mid back pain. She has also been on Duloxetine and Lidoderm patches.      Pt worked on her exercises for 30 minutes a day for over 6 weeks. She focused on stretching her mid back with flexion and extension and also worked on core strengthening including Karen exercises and crunches. She reports no improvement in her pain since doing these exercises.      Pt does report her burning pain and anxiety has improved since being on Lyrica.      2. The pt had an MRI in 2016 which showed She has multiple disc herniations in her cervical spine and has severe LEFT sided foraminal stenosis at C5/6 and C6/7.      I again reviewed the patients MRI findings (8-) in detail, including review of the actual images and provided a detailed explanation of the findings using a spine model. I also compared this to her previous MRI from 2014. There is a new right sided disc herniation with recess and foraminal stenosis at the L4/5 which is new when compared to her previous MRI from 2014.      3. The pt does have MRI evidence of lumbar disc herniation and did obtain greater than 75% relief with her last lumbar injection. She has continued on an intensive home exercise and therapy program that was reviewed in the office today for over 6 months. She has also continued on Vicodin 5/325mg and Cymbalta 60mg once daily as well for the past 6 months.     Her last cervical injection was 5-2020 with 60% relief lasting 1 months time. She reports her pain has returned with numbness in her left arm. She " reports she has trouble sleeping due to her pain now 1 month later. After her injection she reports she was able to sleep with less pain.      She has continued home PT exercises as reviewed with us in the office today and at her previous visits. She has been working on exercises as reviewed for over 6 consecutive months and has also been on Cymbalta and Tizanidine for over 6 months as well.     Pt had a new MRI of the LUMBAR spine on 5-3-2024 and it showed a LEFT sided disc herniation at the L3/4 and L5/S1 level and a right sided disc herniation at the L4/5 level.    4. The patient is a candidate for a TESI vs LESI L5/S1 vs LEFT versus RIGHT L4 AND L5 LTR to treat back and radicular pain. I spent time with the patient discussing all of the risks, benefits, and alternatives to this measure. Including but not limited to spinal infection, epidural hematoma/abscess, paralysis, nerve injury, steroid effects, and spinal headache. The patient understands and agrees to proceed.     Her most recent injection was 2- for a right LTR and prior to that on 2-2021 and she again reported 50% last lumbar injection was over 6 months ago in January / March of 2020. She reported 50% relief with that injection and that she was able to sleep and walk with less pain. However with evidence of a new disc herniation at L4/5 as noted above would recommend a repeat procedure since she reports trouble sleeping due to pain.      Her last TESI was on 2- and she reported greater than 50% relief of her pain that lasted for 1 months time and has slowly worn off. She has been able to stand and pivot with much less pain.      5. We again did discuss the risks, benefits, and alternatives to chronic opioid therapy and that usage can be a danger to life. We also discussed proper and safe storage of medication to prevent unauthorized usage. The patient understands and will use the medicine responsibly as managed by her PCP Nora Cain.  She reports she is actively being weaned off of this medication.      Recall: We did discussed OIH at several visits. Pt reports she is interested in weaning off of this medication.     6. I have referred the patient to physical therapy/aqua therapy to learn and perform exercises to strengthen core/extremties, maintain stabilization, increase range of motion, and reduce pain.    Pt completed over 6 weeks of aquatherapy and reports it was very helpful for her, but continues to suffer from radicular pain. She has also failed over 6 weeks of Vicodin, Lidoderm patches, Lyrica, and Cyclobenzaprine.     Given her weakness on exam and her above findings I would recommend a new MRI of the lumbar spine.     LESI L5/S1     Matthew Neal MD     I spent time with the patient reviewing their imaging and discussing the risks benefits and alternatives to the above plan. A total of 30 minutes was spent reviewing the data and greater than 50% of that time was with the patient during the face to face encounter discussing treatment options both surgical, non-surgical, and minimally invasive techniques.

## 2024-05-28 NOTE — PROGRESS NOTES
History Of Present Illness  Karoline Carrera is a 58 y.o. female presenting with   Chief Complaint   Patient presents with    Follow-up       Patient returns does have a history of chronic NECK PAIN TO THE BILATERAL SHOULDER area worse on the LEFT side.      She was seen for chest pain by her NP and had work up done which was negative. Her worst pain currently is into her LLE hip and thigh area.      The pain is constant, worse with activity and better with rest. The pain is sharp, stabbing and shooting to the B/L LE. Denies LE paresthesias, weakness, saddle anesthesia, bowel or bladder incontinence. To manage this pain the patient has attempted Vicodin as prescribed by her PCP. The patient has undergone a IDALIA on May 11 2016 with 50% relief for several weeks with gradual return to baseline.      PAIN SCORE: 8/10 in her mid back with burning.      FHx  Of note: Pt previously reports her father is an alcoholic and she has been traveling back from [place].      Recall: Pt reports her daughter's father was diagnosed with stage 4 bladder cancer in April of 2020 and has reportedly has only 6 months to live. He reportedly is s/p spinal mass resection at [facility].      PCP: Nora Cain        Past Medical History  She has a past medical history of Other specified health status.    Surgical History  She has a past surgical history that includes Tonsillectomy (04/08/2016); Other surgical history (04/07/2022); and CT angio coronary art with heartflow if score >30% (3/31/2023).     Social History  She reports that she has quit smoking. Her smoking use included cigarettes. She has never used smokeless tobacco. She reports that she does not drink alcohol and does not use drugs.    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     Family History  No family history on file.     Allergies  Sulfa (sulfonamide antibiotics)    Review of Systems    All other systems reviewed and negative for any deficits. Pertinent positives  and negatives were considered in the medical decision making process.        Physical Exam  /75   Pulse 89   Temp 36.5 °C (97.7 °F)   Resp 15   Wt 45.4 kg (100 lb)   SpO2 97%     General: Pt appears stated age     Eyes: Conjunctiva non-icteric and lids without obvious rash or drooping. Pupils are symmetric     Neck: No JVD noted, tracheal position midline.     Musculoskeletal: Gait is grossly normal     Digits/nails show no clubbing or cyanosis     Exam of muscles/joints/bones shows no gross atrophy and no abnormal/involuntary movements in the head/neckNo asymmetry or masses noted in the head/neck     Stability: no subluxation noted on movement of bilateral upper extremities or head/neck     Strength: 4/5 in RLE and 5/5 in LLE   Positive straight leg raise test in RLE     Range of Motion: WNL      Sensation: decreased to sharp touch in right hand      Cranial nerves 2-12 are grossly intact     Psychiatric: Pt is alert and oriented to time, place and person.         Assessment/Plan   1. Chronic neck pain        2. Lumbar disc herniation        3. Cervical radiculitis        4. Lumbar neuritis              · Chronic low back pain (724.2,338.29) (M54.50,G89.29)   · Lumbosacral neuritis (724.4) (M54.17)   · Lumbar disc herniation (722.10) (M51.26)   · Lumbar radiculopathy, right (724.4) (M54.16)   · Cervical disc herniation (722.0) (M50.20)   · Cervical radicular pain (723.4) (M54.12)     Provider Impressions        1. The pt attempted Gabapentin and LYRICA, but reports it limits her mental function and was difficult for her to work and drive.      Pt previously signed a [employer] contract on 4-7-2022 and it was reviewed line by line .  Pt will provide UDS on 4-7-2022.      Pt has since d/c Lyrica on her own due to feeling foggy.      Recall: She was doing well with Cymbalta 60mg once a day. She reports her neck and leg pain has reduced since starting this medication. We previously did increase her dosage to  "Cymbalta 80mg at bedtime and she reported she felt drowsy and has since weaned herself of she also reported nightmares. Her PCP took her off of this medication due to \"brain fog\" which improved after she discontinued it.      WILL ALSO OBTAIN AN MRI OF THE THORACIC SPINE since her pain has not improved. This was reportedly denied by her insurance.      She has attempted over 6 weeks of doctor supervised therapy with no improvement in her mid back pain. She has also been on Duloxetine and Lidoderm patches.      Pt worked on her exercises for 30 minutes a day for over 6 weeks. She focused on stretching her mid back with flexion and extension and also worked on core strengthening including Karen exercises and crunches. She reports no improvement in her pain since doing these exercises.      Pt does report her burning pain and anxiety has improved since being on Lyrica.      2. The pt had an MRI in 2016 which showed She has multiple disc herniations in her cervical spine and has severe LEFT sided foraminal stenosis at C5/6 and C6/7.      I again reviewed the patients MRI findings (8-) in detail, including review of the actual images and provided a detailed explanation of the findings using a spine model. I also compared this to her previous MRI from 2014. There is a new right sided disc herniation with recess and foraminal stenosis at the L4/5 which is new when compared to her previous MRI from 2014.      3. The pt does have MRI evidence of lumbar disc herniation and did obtain greater than 75% relief with her last lumbar injection. She has continued on an intensive home exercise and therapy program that was reviewed in the office today for over 6 months. She has also continued on Vicodin 5/325mg and Cymbalta 60mg once daily as well for the past 6 months.     Her last cervical injection was 5-2020 with 60% relief lasting 1 months time. She reports her pain has returned with numbness in her left arm. She " reports she has trouble sleeping due to her pain now 1 month later. After her injection she reports she was able to sleep with less pain.      She has continued home PT exercises as reviewed with us in the office today and at her previous visits. She has been working on exercises as reviewed for over 6 consecutive months and has also been on Cymbalta and Tizanidine for over 6 months as well.     Pt had a new MRI of the LUMBAR spine on 5-3-2024 and it showed a LEFT sided disc herniation at the L3/4 and L5/S1 level and a right sided disc herniation at the L4/5 level.    4. The patient is a candidate for a TESI vs LESI L5/S1 vs LEFT versus RIGHT L4 AND L5 LTR to treat back and radicular pain. I spent time with the patient discussing all of the risks, benefits, and alternatives to this measure. Including but not limited to spinal infection, epidural hematoma/abscess, paralysis, nerve injury, steroid effects, and spinal headache. The patient understands and agrees to proceed.     Her most recent injection was 2- for a right LTR and prior to that on 2-2021 and she again reported 50% last lumbar injection was over 6 months ago in January / March of 2020. She reported 50% relief with that injection and that she was able to sleep and walk with less pain. However with evidence of a new disc herniation at L4/5 as noted above would recommend a repeat procedure since she reports trouble sleeping due to pain.      Her last TESI was on 2- and she reported greater than 50% relief of her pain that lasted for 1 months time and has slowly worn off. She has been able to stand and pivot with much less pain.      5. We again did discuss the risks, benefits, and alternatives to chronic opioid therapy and that usage can be a danger to life. We also discussed proper and safe storage of medication to prevent unauthorized usage. The patient understands and will use the medicine responsibly as managed by her PCP Nora Cain.  She reports she is actively being weaned off of this medication.      Recall: We did discussed OIH at several visits. Pt reports she is interested in weaning off of this medication.     6. I have referred the patient to physical therapy/aqua therapy to learn and perform exercises to strengthen core/extremties, maintain stabilization, increase range of motion, and reduce pain.    Pt completed over 6 weeks of aquatherapy and reports it was very helpful for her, but continues to suffer from radicular pain. She has also failed over 6 weeks of Vicodin, Lidoderm patches, Lyrica, and Cyclobenzaprine.     Given her weakness on exam and her above findings I would recommend a new MRI of the lumbar spine.     LESI L5/S1     Matthew Neal MD     I spent time with the patient reviewing their imaging and discussing the risks benefits and alternatives to the above plan. A total of 30 minutes was spent reviewing the data and greater than 50% of that time was with the patient during the face to face encounter discussing treatment options both surgical, non-surgical, and minimally invasive techniques.

## 2024-06-05 ENCOUNTER — APPOINTMENT (OUTPATIENT)
Dept: PAIN MEDICINE | Facility: CLINIC | Age: 59
End: 2024-06-05
Payer: MEDICARE

## 2024-06-14 ENCOUNTER — HOSPITAL ENCOUNTER (OUTPATIENT)
Dept: RADIOLOGY | Facility: CLINIC | Age: 59
Discharge: HOME | End: 2024-06-14
Payer: MEDICARE

## 2024-06-14 ENCOUNTER — HOSPITAL ENCOUNTER (OUTPATIENT)
Dept: PAIN MEDICINE | Facility: CLINIC | Age: 59
Discharge: HOME | End: 2024-06-14
Payer: MEDICARE

## 2024-06-14 VITALS
TEMPERATURE: 98.6 F | OXYGEN SATURATION: 95 % | SYSTOLIC BLOOD PRESSURE: 131 MMHG | HEART RATE: 81 BPM | DIASTOLIC BLOOD PRESSURE: 65 MMHG | HEIGHT: 60 IN | RESPIRATION RATE: 15 BRPM | WEIGHT: 92 LBS | BODY MASS INDEX: 18.06 KG/M2

## 2024-06-14 DIAGNOSIS — M54.12 CERVICAL RADICULITIS: Primary | ICD-10-CM

## 2024-06-14 DIAGNOSIS — M54.16 LUMBAR NEURITIS: ICD-10-CM

## 2024-06-14 DIAGNOSIS — M51.26 LUMBAR DISC HERNIATION: ICD-10-CM

## 2024-06-14 PROCEDURE — 2500000002 HC RX 250 W HCPCS SELF ADMINISTERED DRUGS (ALT 637 FOR MEDICARE OP, ALT 636 FOR OP/ED): Performed by: ANESTHESIOLOGY

## 2024-06-14 PROCEDURE — 2500000005 HC RX 250 GENERAL PHARMACY W/O HCPCS

## 2024-06-14 PROCEDURE — 2500000004 HC RX 250 GENERAL PHARMACY W/ HCPCS (ALT 636 FOR OP/ED)

## 2024-06-14 PROCEDURE — A4216 STERILE WATER/SALINE, 10 ML: HCPCS

## 2024-06-14 RX ORDER — LIDOCAINE HYDROCHLORIDE 5 MG/ML
INJECTION, SOLUTION INFILTRATION; INTRAVENOUS
Status: COMPLETED
Start: 2024-06-14 | End: 2024-06-14

## 2024-06-14 RX ORDER — TRIAMCINOLONE ACETONIDE 40 MG/ML
INJECTION, SUSPENSION INTRA-ARTICULAR; INTRAMUSCULAR
Status: COMPLETED
Start: 2024-06-14 | End: 2024-06-14

## 2024-06-14 RX ORDER — DIAZEPAM 5 MG/1
TABLET ORAL
Status: DISCONTINUED
Start: 2024-06-14 | End: 2024-06-15 | Stop reason: HOSPADM

## 2024-06-14 RX ORDER — SODIUM CHLORIDE 9 MG/ML
INJECTION, SOLUTION INTRAMUSCULAR; INTRAVENOUS; SUBCUTANEOUS
Status: COMPLETED
Start: 2024-06-14 | End: 2024-06-14

## 2024-06-14 RX ORDER — DIAZEPAM 5 MG/1
5 TABLET ORAL ONCE
Status: COMPLETED | OUTPATIENT
Start: 2024-06-14 | End: 2024-06-14

## 2024-06-14 ASSESSMENT — LIFESTYLE VARIABLES
HOW OFTEN DURING THE LAST YEAR HAVE YOU HAD A FEELING OF GUILT OR REMORSE AFTER DRINKING: NEVER
HOW MANY STANDARD DRINKS CONTAINING ALCOHOL DO YOU HAVE ON A TYPICAL DAY: PATIENT DOES NOT DRINK
HOW OFTEN DO YOU HAVE SIX OR MORE DRINKS ON ONE OCCASION: NEVER
AUDIT-C TOTAL SCORE: 1
HOW OFTEN DURING THE LAST YEAR HAVE YOU BEEN UNABLE TO REMEMBER WHAT HAPPENED THE NIGHT BEFORE BECAUSE YOU HAD BEEN DRINKING: NEVER
HAS A RELATIVE, FRIEND, DOCTOR, OR ANOTHER HEALTH PROFESSIONAL EXPRESSED CONCERN ABOUT YOUR DRINKING OR SUGGESTED YOU CUT DOWN: NO
AUDIT TOTAL SCORE: 1
HOW OFTEN DO YOU HAVE A DRINK CONTAINING ALCOHOL: MONTHLY OR LESS
HOW OFTEN DURING THE LAST YEAR HAVE YOU FAILED TO DO WHAT WAS NORMALLY EXPECTED FROM YOU BECAUSE OF DRINKING: NEVER
HOW OFTEN DURING THE LAST YEAR HAVE YOU FOUND THAT YOU WERE NOT ABLE TO STOP DRINKING ONCE YOU HAD STARTED: NEVER
HOW OFTEN DURING THE LAST YEAR HAVE YOU NEEDED AN ALCOHOLIC DRINK FIRST THING IN THE MORNING TO GET YOURSELF GOING AFTER A NIGHT OF HEAVY DRINKING: NEVER
SKIP TO QUESTIONS 9-10: 1
HAVE YOU OR SOMEONE ELSE BEEN INJURED AS A RESULT OF YOUR DRINKING: NO

## 2024-06-14 ASSESSMENT — COLUMBIA-SUICIDE SEVERITY RATING SCALE - C-SSRS
1. IN THE PAST MONTH, HAVE YOU WISHED YOU WERE DEAD OR WISHED YOU COULD GO TO SLEEP AND NOT WAKE UP?: NO
2. HAVE YOU ACTUALLY HAD ANY THOUGHTS OF KILLING YOURSELF?: NO
6. HAVE YOU EVER DONE ANYTHING, STARTED TO DO ANYTHING, OR PREPARED TO DO ANYTHING TO END YOUR LIFE?: NO

## 2024-06-14 ASSESSMENT — PAIN - FUNCTIONAL ASSESSMENT
PAIN_FUNCTIONAL_ASSESSMENT: 0-10
PAIN_FUNCTIONAL_ASSESSMENT: 0-10

## 2024-06-14 ASSESSMENT — PAIN SCALES - GENERAL
PAINLEVEL_OUTOF10: 5 - MODERATE PAIN
PAINLEVEL_OUTOF10: 3

## 2024-06-14 ASSESSMENT — ENCOUNTER SYMPTOMS
DEPRESSION: 0
LOSS OF SENSATION IN FEET: 0
OCCASIONAL FEELINGS OF UNSTEADINESS: 0

## 2024-06-14 ASSESSMENT — PAIN DESCRIPTION - DESCRIPTORS: DESCRIPTORS: STABBING;PRESSURE

## 2024-09-16 ENCOUNTER — APPOINTMENT (OUTPATIENT)
Dept: PAIN MEDICINE | Facility: CLINIC | Age: 59
End: 2024-09-16
Payer: MEDICARE

## 2024-09-25 ENCOUNTER — OFFICE VISIT (OUTPATIENT)
Dept: PAIN MEDICINE | Facility: CLINIC | Age: 59
End: 2024-09-25
Payer: MEDICARE

## 2024-09-25 VITALS
TEMPERATURE: 96.4 F | DIASTOLIC BLOOD PRESSURE: 71 MMHG | OXYGEN SATURATION: 98 % | SYSTOLIC BLOOD PRESSURE: 96 MMHG | HEART RATE: 64 BPM | RESPIRATION RATE: 15 BRPM | WEIGHT: 92 LBS | BODY MASS INDEX: 17.97 KG/M2

## 2024-09-25 DIAGNOSIS — M51.26 LUMBAR DISC HERNIATION: ICD-10-CM

## 2024-09-25 DIAGNOSIS — M54.2 CHRONIC NECK PAIN: ICD-10-CM

## 2024-09-25 DIAGNOSIS — G89.29 CHRONIC NECK PAIN: ICD-10-CM

## 2024-09-25 DIAGNOSIS — M54.16 LUMBAR NEURITIS: ICD-10-CM

## 2024-09-25 DIAGNOSIS — M54.12 CERVICAL RADICULITIS: Primary | ICD-10-CM

## 2024-09-25 DIAGNOSIS — R29.898 LEFT ARM WEAKNESS: ICD-10-CM

## 2024-09-25 PROCEDURE — 99214 OFFICE O/P EST MOD 30 MIN: CPT | Performed by: ANESTHESIOLOGY

## 2024-09-25 ASSESSMENT — ENCOUNTER SYMPTOMS
LOSS OF SENSATION IN FEET: 0
OCCASIONAL FEELINGS OF UNSTEADINESS: 0

## 2024-09-25 ASSESSMENT — PAIN SCALES - GENERAL: PAINLEVEL: 7

## 2024-09-25 NOTE — PROGRESS NOTES
History Of Present Illness  Karoline Carrera is a 59 y.o. female presenting with   Chief Complaint   Patient presents with    Follow-up       Patient returns does have a history of chronic NECK PAIN TO THE BILATERAL SHOULDER area worse on the LEFT side with a pressure like sensation. She reports her neck pain has been worse lately.      She was seen for chest pain by her NP and had work up done which was negative. Her worst pain currently is into her LLE hip and thigh area.      The pain is constant, worse with activity and better with rest. The pain is sharp, stabbing and shooting to the B/L LE. Denies LE paresthesias, weakness, saddle anesthesia, bowel or bladder incontinence. To manage this pain the patient has attempted Vicodin as prescribed by her PCP. The patient has undergone a IDALIA on May 11 2016 with 50% relief for several weeks with gradual return to baseline.      PAIN SCORE: 8/10 in her neck  mid back with burning.      FHx  Of note: Pt previously reports her father is an alcoholic and she has been traveling back from [place].      Recall: Pt reports her daughter's father was diagnosed with stage 4 bladder cancer in April of 2020 and has reportedly has only 6 months to live. He reportedly is s/p spinal mass resection at  [facility].      PCP: Nora Cain        Past Medical History  She has a past medical history of Other specified health status.    Surgical History  She has a past surgical history that includes Tonsillectomy (04/08/2016); Other surgical history (04/07/2022); and CT angio coronary art with heartflow if score >30% (3/31/2023).     Social History  She reports that she has quit smoking. Her smoking use included cigarettes. She has never used smokeless tobacco. She reports that she does not drink alcohol and does not use drugs.    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     Family History  No family history on file.     Allergies  Sulfa (sulfonamide antibiotics)    Review of  Systems    All other systems reviewed and negative for any deficits. Pertinent positives and negatives were considered in the medical decision making process.        Physical Exam  BP 96/71   Pulse 64   Temp 35.8 °C (96.4 °F)   Resp 15   Wt (!) 41.7 kg (92 lb)   SpO2 98%     General: Pt appears stated age     Eyes: Conjunctiva non-icteric and lids without obvious rash or drooping. Pupils are symmetric     Neck: No JVD noted, tracheal position midline.     Musculoskeletal: Gait is grossly normal     Digits/nails show no clubbing or cyanosis     Exam of muscles/joints/bones shows no gross atrophy and no abnormal/involuntary movements in the head/neckNo asymmetry or masses noted in the head/neck     Stability: no subluxation noted on movement of bilateral upper extremities or head/neck     Strength: 4/5 in RLE and 5/5 in LLE   Positive straight leg raise test in RLE  3/5 in LUE and 4/5 in RUE      Range of Motion: WNL      Sensation: decreased to sharp touch in right hand and dec along left C6 dermatome.      Cranial nerves 2-12 are grossly intact     Psychiatric: Pt is alert and oriented to time, place and person.         Assessment/Plan   1. Cervical radiculitis  MR cervical spine wo IV contrast      2. Chronic neck pain  MR cervical spine wo IV contrast      3. Lumbar disc herniation        4. Lumbar neuritis        5. Left arm weakness  MR cervical spine wo IV contrast            · Chronic low back pain (724.2,338.29) (M54.50,G89.29)   · Lumbosacral neuritis (724.4) (M54.17)   · Lumbar disc herniation (722.10) (M51.26)   · Lumbar radiculopathy, right (724.4) (M54.16)   · Cervical disc herniation (722.0) (M50.20)   · Cervical radicular pain (723.4) (M54.12)     Provider Impressions        1. The pt attempted Gabapentin and LYRICA, but reports it limits her mental function and was difficult for her to work and drive.      Pt previously signed a [employer] contract on 4-7-2022 and it was reviewed line by line .  Pt  "will provide UDS on 4-7-2022.      Pt has since d/c Lyrica on her own due to feeling foggy.      Recall: She was doing well with Cymbalta 60mg once a day. She reports her neck and leg pain has reduced since starting this medication. We previously did increase her dosage to Cymbalta 80mg at bedtime and she reported she felt drowsy and has since weaned herself of she also reported nightmares. Her PCP took her off of this medication due to \"brain fog\" which improved after she discontinued it.      WILL ALSO OBTAIN AN MRI OF THE THORACIC SPINE since her pain has not improved. This was reportedly denied by her insurance.      She has attempted over 6 weeks of doctor supervised therapy with no improvement in her mid back pain. She has also been on Duloxetine and Lidoderm patches.      Pt worked on her exercises for 30 minutes a day for over 6 weeks. She focused on stretching her mid back with flexion and extension and also worked on core strengthening including Karen exercises and crunches. She reports no improvement in her pain since doing these exercises.      Pt does report her burning pain and anxiety has improved since being on Lyrica.      2. The pt had an MRI in 2016 which showed She has multiple disc herniations in her cervical spine and has severe LEFT sided foraminal stenosis at C5/6 and C6/7.      I again reviewed the patients MRI findings (8-) in detail, including review of the actual images and provided a detailed explanation of the findings using a spine model. I also compared this to her previous MRI from 2014. There is a new right sided disc herniation with recess and foraminal stenosis at the L4/5 which is new when compared to her previous MRI from 2014.      3. The pt does have MRI evidence of lumbar disc herniation and did obtain greater than 75% relief with her last lumbar injection. She has continued on an intensive home exercise and therapy program that was reviewed in the office today for " over 6 months. She has also continued on Vicodin 5/325mg and Cymbalta 60mg once daily as well for the past 6 months.     Her last cervical injection was 5-2020 with 60% relief lasting 1 months time. She reports her pain has returned with numbness in her left arm. She reports she has trouble sleeping due to her pain now 1 month later. After her injection she reports she was able to sleep with less pain.      She has continued home PT exercises as reviewed with us in the office today and at her previous visits. She has been working on exercises as reviewed for over 6 consecutive months and has also been on Cymbalta and Tizanidine for over 6 months as well.     Pt had a new MRI of the LUMBAR spine on 5-3-2024 and it showed a LEFT sided disc herniation at the L3/4 and L5/S1 level and a right sided disc herniation at the L4/5 level.    4. The patient is a candidate for a TESI vs LESI L5/S1 vs LEFT versus RIGHT L4 AND L5 LTR to treat back and radicular pain. I spent time with the patient discussing all of the risks, benefits, and alternatives to this measure. Including but not limited to spinal infection, epidural hematoma/abscess, paralysis, nerve injury, steroid effects, and spinal headache. The patient understands and agrees to proceed.     Her most recent LUMBAR injection was 6- and prior to that 2- for a right LTR and prior to that on 2-2021 and she again reported 50% last lumbar injection was over 6 months ago in January / March of 2020. She reported 50% relief with that injection and that she was able to sleep and walk with less pain. However with evidence of a new disc herniation at L4/5 as noted above would recommend a repeat procedure since she reports trouble sleeping due to pain.      Her last TESI was on 2- and she reported greater than 50% relief of her pain that lasted for 1 months time and has slowly worn off. She has been able to stand and pivot with much less pain.      5. We again  did discuss the risks, benefits, and alternatives to chronic opioid therapy and that usage can be a danger to life. We also discussed proper and safe storage of medication to prevent unauthorized usage. The patient understands and will use the medicine responsibly as managed by her PCP Nora Cain. She reports she is actively being weaned off of this medication.      Recall: We did discussed OIH at several visits. Pt reports she is interested in weaning off of this medication.     6. I have referred the patient to physical therapy/aqua therapy to learn and perform exercises to strengthen core/extremties, maintain stabilization, increase range of motion, and reduce pain.    Pt completed over 6 weeks of aquatherapy and reports it was very helpful for her, but continues to suffer from radicular pain. She has also failed over 6 weeks of Vicodin, Lidoderm patches, Lyrica, and Cyclobenzaprine.     Given her weakness on exam and her above findings I would recommend a new MRI of the CERVICAL spine. Her last cervical MRI was in 2016.       Matthew Neal MD     I spent time with the patient reviewing their imaging and discussing the risks benefits and alternatives to the above plan. A total of 30 minutes was spent reviewing the data and greater than 50% of that time was with the patient during the face to face encounter discussing treatment options both surgical, non-surgical, and minimally invasive techniques.

## 2024-09-25 NOTE — PROGRESS NOTES
Low back pain.  Neck and head pain left arm. Denies hip and knee pain.  Denies back and neck surgery.

## 2024-09-26 DIAGNOSIS — M54.16 LUMBAR NEURITIS: Primary | ICD-10-CM

## 2024-09-26 RX ORDER — DIAZEPAM 5 MG/1
TABLET ORAL
Qty: 2 TABLET | Refills: 0 | Status: SHIPPED | OUTPATIENT
Start: 2024-09-26

## 2024-10-11 ENCOUNTER — HOSPITAL ENCOUNTER (OUTPATIENT)
Dept: RADIOLOGY | Facility: CLINIC | Age: 59
Discharge: HOME | End: 2024-10-11
Payer: MEDICARE

## 2024-10-11 DIAGNOSIS — R29.898 LEFT ARM WEAKNESS: ICD-10-CM

## 2024-10-11 DIAGNOSIS — M54.2 CHRONIC NECK PAIN: ICD-10-CM

## 2024-10-11 DIAGNOSIS — G89.29 CHRONIC NECK PAIN: ICD-10-CM

## 2024-10-11 DIAGNOSIS — M54.12 CERVICAL RADICULITIS: ICD-10-CM

## 2024-10-11 PROCEDURE — 72141 MRI NECK SPINE W/O DYE: CPT

## 2024-10-24 ENCOUNTER — OFFICE VISIT (OUTPATIENT)
Dept: PAIN MEDICINE | Facility: CLINIC | Age: 59
End: 2024-10-24
Payer: MEDICARE

## 2024-10-24 VITALS
OXYGEN SATURATION: 100 % | RESPIRATION RATE: 20 BRPM | DIASTOLIC BLOOD PRESSURE: 76 MMHG | SYSTOLIC BLOOD PRESSURE: 114 MMHG | TEMPERATURE: 96.1 F | WEIGHT: 103 LBS | HEART RATE: 62 BPM | BODY MASS INDEX: 20.22 KG/M2 | HEIGHT: 60 IN

## 2024-10-24 DIAGNOSIS — M54.12 CERVICAL RADICULITIS: Primary | ICD-10-CM

## 2024-10-24 DIAGNOSIS — M51.26 LUMBAR DISC HERNIATION: ICD-10-CM

## 2024-10-24 DIAGNOSIS — G89.29 CHRONIC NECK PAIN: ICD-10-CM

## 2024-10-24 DIAGNOSIS — M54.16 LUMBAR NEURITIS: ICD-10-CM

## 2024-10-24 DIAGNOSIS — M54.2 CHRONIC NECK PAIN: ICD-10-CM

## 2024-10-24 PROCEDURE — 99214 OFFICE O/P EST MOD 30 MIN: CPT | Performed by: ANESTHESIOLOGY

## 2024-10-24 ASSESSMENT — PAIN SCALES - GENERAL
PAINLEVEL_OUTOF10: 7
PAINLEVEL_OUTOF10: 7

## 2024-10-24 ASSESSMENT — ENCOUNTER SYMPTOMS
DEPRESSION: 0
LOSS OF SENSATION IN FEET: 0
OCCASIONAL FEELINGS OF UNSTEADINESS: 0

## 2024-10-24 ASSESSMENT — PATIENT HEALTH QUESTIONNAIRE - PHQ9
1. LITTLE INTEREST OR PLEASURE IN DOING THINGS: NOT AT ALL
2. FEELING DOWN, DEPRESSED OR HOPELESS: NOT AT ALL
SUM OF ALL RESPONSES TO PHQ9 QUESTIONS 1 AND 2: 0

## 2024-10-24 ASSESSMENT — PAIN - FUNCTIONAL ASSESSMENT: PAIN_FUNCTIONAL_ASSESSMENT: 0-10

## 2024-10-24 NOTE — PROGRESS NOTES
History Of Present Illness  Karoline Carrera is a 59 y.o. female presenting with   Chief Complaint   Patient presents with    Neck Pain     Mri review 9/25/24 cervical  Neck pain today 7/10 radiate to head left arm        Patient returns does have a history of chronic NECK PAIN TO THE BILATERAL SHOULDER area worse on the LEFT side with a pressure like sensation. She reports her neck pain has been worse lately.      She was seen for chest pain by her NP and had work up done which was negative. Her worst pain currently is into her LLE hip and thigh area.      The pain is constant, worse with activity and better with rest. The pain is sharp, stabbing and shooting to the B/L LE. Denies LE paresthesias, weakness, saddle anesthesia, bowel or bladder incontinence. To manage this pain the patient has attempted Vicodin as prescribed by her PCP. The patient has undergone a IDALIA on May 11 2016 with 50% relief for several weeks with gradual return to baseline.      PAIN SCORE: 7/10 in her neck  mid back with burning.      FHx  Of note: Pt previously reports her father is an alcoholic and she has been traveling back from [place].      Recall: Pt reports her daughter's father was diagnosed with stage 4 bladder cancer in April of 2020 and has reportedly has only 6 months to live. He reportedly is [facility].      PCP: Nora Cain        Past Medical History  She has a past medical history of Other specified health status.    Surgical History  She has a past surgical history that includes Tonsillectomy (04/08/2016); Other surgical history (04/07/2022); and CT angio coronary art with heartflow if score >30% (3/31/2023).     Social History  She reports that she has quit smoking. Her smoking use included cigarettes. She has never used smokeless tobacco. She reports that she does not drink alcohol and does not use drugs.    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     Family History  No family  history on file.     Allergies  Sulfa (sulfonamide antibiotics)    Review of Systems    All other systems reviewed and negative for any deficits. Pertinent positives and negatives were considered in the medical decision making process.        Physical Exam  /76 (Patient Position: Sitting)   Pulse 62   Temp 35.6 °C (96.1 °F)   Resp 20   Wt 46.7 kg (103 lb)   SpO2 100%     General: Pt appears stated age     Eyes: Conjunctiva non-icteric and lids without obvious rash or drooping. Pupils are symmetric     Neck: No JVD noted, tracheal position midline.     Musculoskeletal: Gait is grossly normal     Digits/nails show no clubbing or cyanosis     Exam of muscles/joints/bones shows no gross atrophy and no abnormal/involuntary movements in the head/neckNo asymmetry or masses noted in the head/neck     Stability: no subluxation noted on movement of bilateral upper extremities or head/neck     Strength: 4/5 in RLE and 5/5 in LLE   Positive straight leg raise test in RLE  3/5 in LUE and 4/5 in RUE      Range of Motion: WNL      Sensation: decreased to sharp touch in right hand and dec along left C6 dermatome.      Cranial nerves 2-12 are grossly intact     Psychiatric: Pt is alert and oriented to time, place and person.         Assessment/Plan   No diagnosis found.        · Chronic low back pain (724.2,338.29) (M54.50,G89.29)   · Lumbosacral neuritis (724.4) (M54.17)   · Lumbar disc herniation (722.10) (M51.26)   · Lumbar radiculopathy, right (724.4) (M54.16)   · Cervical disc herniation (722.0) (M50.20)   · Cervical radicular pain (723.4) (M54.12)     Provider Impressions        1. The pt attempted Gabapentin and LYRICA, but reports it limits her mental function and was difficult for her to work and drive.      Pt previously signed a [employer] contract on 4-7-2022 and it was reviewed line by line .  Pt will provide UDS on 4-7-2022.      Pt has since d/c Lyrica on her own due to feeling foggy.      Recall: She was doing  "well with Cymbalta 60mg once a day. She reports her neck and leg pain has reduced since starting this medication. We previously did increase her dosage to Cymbalta 80mg at bedtime and she reported she felt drowsy and has since weaned herself of she also reported nightmares. Her PCP took her off of this medication due to \"brain fog\" which improved after she discontinued it.      WILL ALSO OBTAIN AN MRI OF THE THORACIC SPINE since her pain has not improved. This was reportedly denied by her insurance.      She has attempted over 6 weeks of doctor supervised therapy with no improvement in her mid back pain. She has also been on Duloxetine and Lidoderm patches.      Pt worked on her exercises for 30 minutes a day for over 6 weeks. She focused on stretching her mid back with flexion and extension and also worked on core strengthening including Karen exercises and crunches. She reports no improvement in her pain since doing these exercises.      Pt does report her burning pain and anxiety has improved since being on Lyrica.      2. The pt had an MRI in 2016 which showed She has multiple disc herniations in her cervical spine and has severe LEFT sided foraminal stenosis at C5/6 and C6/7.      I again reviewed the patients MRI findings (8-) in detail, including review of the actual images and provided a detailed explanation of the findings using a spine model. I also compared this to her previous MRI from 2014. There is a new right sided disc herniation with recess and foraminal stenosis at the L4/5 which is new when compared to her previous MRI from 2014.      3. The pt does have MRI evidence of lumbar disc herniation and did obtain greater than 75% relief with her last lumbar injection. She has continued on an intensive home exercise and therapy program that was reviewed in the office today for over 6 months. She has also continued on Vicodin 5/325mg and Cymbalta 60mg once daily as well for the past 6 months.   "   Her last cervical injection was 5-2020 with 60% relief lasting 1 months time. She reports her pain has returned with numbness in her left arm. She reports she has trouble sleeping due to her pain now 1 month later. After her injection she reports she was able to sleep with less pain.      She has continued home PT exercises as reviewed with us in the office today and at her previous visits. She has been working on exercises as reviewed for over 6 consecutive months and has also been on Cymbalta and Tizanidine for over 6 months as well.     Pt had a new MRI of the LUMBAR spine on 5-3-2024 and it showed a LEFT sided disc herniation at the L3/4 and L5/S1 level and a right sided disc herniation at the L4/5 level.    4. The patient is a candidate for a IDALIA, TESI vs LESI L5/S1 vs LEFT versus RIGHT L4 AND L5 LTR to treat back and radicular pain. I spent time with the patient discussing all of the risks, benefits, and alternatives to this measure. Including but not limited to spinal infection, epidural hematoma/abscess, paralysis, nerve injury, steroid effects, and spinal headache. The patient understands and agrees to proceed.     Her most recent LUMBAR injection was 6- and prior to that 2- for a right LTR and prior to that on 2-2021 and she again reported 50% last lumbar injection was over 6 months ago in January / March of 2020. She reported 50% relief with that injection and that she was able to sleep and walk with less pain. However with evidence of a new disc herniation at L4/5 as noted above would recommend a repeat procedure since she reports trouble sleeping due to pain.      Her last TESI was on 2- and she reported greater than 50% relief of her pain that lasted for 1 months time and has slowly worn off. She has been able to stand and pivot with much less pain.      5. We again did discuss the risks, benefits, and alternatives to chronic opioid therapy and that usage can be a danger to  life. We also discussed proper and safe storage of medication to prevent unauthorized usage. The patient understands and will use the medicine responsibly as managed by her PCP Nora Cain. She reports she is actively being weaned off of this medication.      Recall: We did discussed OIH at several visits. Pt reports she is interested in weaning off of this medication.     6. I have referred the patient to physical therapy/aqua therapy to learn and perform exercises to strengthen core/extremties, maintain stabilization, increase range of motion, and reduce pain.    Pt completed over 6 weeks of aquatherapy and reports it was very helpful for her, but continues to suffer from radicular pain. She has also failed over 6 weeks of Vicodin, Lidoderm patches, Lyrica, and Cyclobenzaprine.     Given her weakness on exam and her above findings I would recommend a new MRI of the CERVICAL spine. Her last cervical MRI was in 2016.     6. I extensively reviewed the patients CERVICAL MRI (9-2024) findings in detail, including review of the actual images and provided a detailed explanation of the findings using a spine model. There are disc herniations that are unchanged at the C4/5, C5/6, and C6/7 level.         Matthew Neal MD     I spent time with the patient reviewing their imaging and discussing the risks benefits and alternatives to the above plan. A total of 30 minutes was spent reviewing the data and greater than 50% of that time was with the patient during the face to face encounter discussing treatment options both surgical, non-surgical, and minimally invasive techniques.

## 2024-12-03 ENCOUNTER — HOSPITAL ENCOUNTER (OUTPATIENT)
Dept: PAIN MEDICINE | Facility: CLINIC | Age: 59
Discharge: HOME | End: 2024-12-03
Payer: MEDICARE

## 2024-12-03 VITALS
OXYGEN SATURATION: 99 % | DIASTOLIC BLOOD PRESSURE: 71 MMHG | HEART RATE: 84 BPM | TEMPERATURE: 98.4 F | SYSTOLIC BLOOD PRESSURE: 101 MMHG | RESPIRATION RATE: 15 BRPM

## 2024-12-03 DIAGNOSIS — M54.16 LUMBAR NEURITIS: Primary | ICD-10-CM

## 2024-12-03 DIAGNOSIS — M54.12 CERVICAL RADICULITIS: ICD-10-CM

## 2024-12-03 PROCEDURE — 62321 NJX INTERLAMINAR CRV/THRC: CPT | Performed by: ANESTHESIOLOGY

## 2024-12-03 PROCEDURE — 2500000002 HC RX 250 W HCPCS SELF ADMINISTERED DRUGS (ALT 637 FOR MEDICARE OP, ALT 636 FOR OP/ED): Mod: MUE | Performed by: ANESTHESIOLOGY

## 2024-12-03 PROCEDURE — 2500000005 HC RX 250 GENERAL PHARMACY W/O HCPCS: Performed by: ANESTHESIOLOGY

## 2024-12-03 PROCEDURE — 2500000004 HC RX 250 GENERAL PHARMACY W/ HCPCS (ALT 636 FOR OP/ED): Performed by: ANESTHESIOLOGY

## 2024-12-03 PROCEDURE — 2550000001 HC RX 255 CONTRASTS: Performed by: ANESTHESIOLOGY

## 2024-12-03 RX ORDER — SODIUM CHLORIDE 9 MG/ML
INJECTION, SOLUTION INTRAMUSCULAR; INTRAVENOUS; SUBCUTANEOUS AS NEEDED
Status: COMPLETED | OUTPATIENT
Start: 2024-12-03 | End: 2024-12-03

## 2024-12-03 RX ORDER — TRIAMCINOLONE ACETONIDE 40 MG/ML
INJECTION, SUSPENSION INTRA-ARTICULAR; INTRAMUSCULAR AS NEEDED
Status: COMPLETED | OUTPATIENT
Start: 2024-12-03 | End: 2024-12-03

## 2024-12-03 RX ORDER — DIAZEPAM 5 MG/1
10 TABLET ORAL ONCE
Status: COMPLETED | OUTPATIENT
Start: 2024-12-03 | End: 2024-12-03

## 2024-12-03 RX ORDER — LIDOCAINE HYDROCHLORIDE 5 MG/ML
INJECTION, SOLUTION INFILTRATION; INTRAVENOUS AS NEEDED
Status: COMPLETED | OUTPATIENT
Start: 2024-12-03 | End: 2024-12-03

## 2024-12-03 ASSESSMENT — ENCOUNTER SYMPTOMS
SEIZURES: 0
LOSS OF SENSATION IN FEET: 0
DIFFICULTY URINATING: 0
FEVER: 0
CHILLS: 0
NECK PAIN: 1
DIARRHEA: 0
NAUSEA: 0
BLOOD IN STOOL: 0
WHEEZING: 0
DIZZINESS: 0
DEPRESSION: 0
DIAPHORESIS: 0
SPEECH DIFFICULTY: 0
VOMITING: 0
COUGH: 0
NUMBNESS: 1
CONSTIPATION: 0
EYE DISCHARGE: 0
OCCASIONAL FEELINGS OF UNSTEADINESS: 0
ARTHRALGIAS: 1
WEAKNESS: 0
NECK STIFFNESS: 1
SHORTNESS OF BREATH: 0
CHEST TIGHTNESS: 0
BACK PAIN: 0

## 2024-12-03 ASSESSMENT — PATIENT HEALTH QUESTIONNAIRE - PHQ9
2. FEELING DOWN, DEPRESSED OR HOPELESS: NOT AT ALL
SUM OF ALL RESPONSES TO PHQ9 QUESTIONS 1 AND 2: 0
1. LITTLE INTEREST OR PLEASURE IN DOING THINGS: NOT AT ALL

## 2024-12-03 ASSESSMENT — PAIN SCALES - GENERAL
PAINLEVEL_OUTOF10: 7
PAINLEVEL_OUTOF10: 7

## 2024-12-03 ASSESSMENT — PAIN DESCRIPTION - DESCRIPTORS: DESCRIPTORS: ACHING

## 2024-12-03 ASSESSMENT — PAIN - FUNCTIONAL ASSESSMENT: PAIN_FUNCTIONAL_ASSESSMENT: 0-10

## 2024-12-03 ASSESSMENT — COLUMBIA-SUICIDE SEVERITY RATING SCALE - C-SSRS
1. IN THE PAST MONTH, HAVE YOU WISHED YOU WERE DEAD OR WISHED YOU COULD GO TO SLEEP AND NOT WAKE UP?: NO
6. HAVE YOU EVER DONE ANYTHING, STARTED TO DO ANYTHING, OR PREPARED TO DO ANYTHING TO END YOUR LIFE?: NO
2. HAVE YOU ACTUALLY HAD ANY THOUGHTS OF KILLING YOURSELF?: NO

## 2024-12-03 NOTE — H&P
History Of Present Illness  Karoline Carrera is a 59 y.o. female presenting with cervical radiculitis.     Past Medical History  Past Medical History:   Diagnosis Date    Other specified health status     No pertinent past medical history       Surgical History  Past Surgical History:   Procedure Laterality Date    CT ANGIO CORONARY ART WITH HEARTFLOW IF SCORE >30%  3/31/2023    CT ANGIO CORONARY ART WITH HEARTFLOW IF SCORE >30% 3/31/2023    OTHER SURGICAL HISTORY  04/07/2022    Oral surgery    TONSILLECTOMY  04/08/2016    Tonsillectomy With Adenoidectomy        Social History  She reports that she has quit smoking. Her smoking use included cigarettes. She has never used smokeless tobacco. She reports that she does not drink alcohol and does not use drugs.    Family History  No family history on file.     Allergies  Sulfa (sulfonamide antibiotics)    Review of Systems   Constitutional:  Negative for chills, diaphoresis and fever.   HENT:  Negative for ear discharge and tinnitus.    Eyes:  Negative for discharge.   Respiratory:  Negative for cough, chest tightness, shortness of breath and wheezing.    Cardiovascular:  Negative for chest pain.   Gastrointestinal:  Negative for blood in stool, constipation, diarrhea, nausea and vomiting.   Endocrine: Negative for polyuria.   Genitourinary:  Negative for difficulty urinating.   Musculoskeletal:  Positive for arthralgias, neck pain and neck stiffness. Negative for back pain and gait problem.   Skin:  Negative for rash.   Neurological:  Positive for numbness. Negative for dizziness, seizures, speech difficulty and weakness.        Physical Exam  Constitutional:       Appearance: Normal appearance.   HENT:      Head: Normocephalic.      Nose: Nose normal.      Mouth/Throat:      Mouth: Mucous membranes are moist.      Pharynx: Oropharynx is clear.   Eyes:      Extraocular Movements: Extraocular movements intact.      Conjunctiva/sclera: Conjunctivae normal.      Pupils:  Pupils are equal, round, and reactive to light.   Cardiovascular:      Rate and Rhythm: Normal rate.   Pulmonary:      Effort: Pulmonary effort is normal. No respiratory distress.      Breath sounds: No wheezing.   Chest:      Chest wall: No tenderness.   Abdominal:      Palpations: Abdomen is soft.   Musculoskeletal:      Cervical back: No rigidity.   Skin:     General: Skin is warm and dry.      Findings: No rash.   Neurological:      Mental Status: She is alert and oriented to person, place, and time.      Sensory: Sensory deficit present.      Motor: Weakness present.      Gait: Gait abnormal.   Psychiatric:         Mood and Affect: Mood normal.         Behavior: Behavior normal.          Last Recorded Vitals  Blood pressure 118/70, pulse 68, temperature 36.9 °C (98.4 °F), temperature source Tympanic, resp. rate 20, SpO2 99%.       Assessment/Plan   1. Lumbar neuritis  diazePAM (Valium) tablet 10 mg      2. Cervical radiculitis  Epidural Steroid Injection    Epidural Steroid Injection    FL pain management    FL pain management           Matthew Neal MD

## 2024-12-19 DIAGNOSIS — M54.16 LUMBAR NEURITIS: ICD-10-CM

## 2024-12-19 RX ORDER — LIDOCAINE 50 MG/G
PATCH TOPICAL
Qty: 30 PATCH | Refills: 5 | Status: SHIPPED | OUTPATIENT
Start: 2024-12-19

## 2025-03-03 ENCOUNTER — OFFICE VISIT (OUTPATIENT)
Dept: PAIN MEDICINE | Facility: CLINIC | Age: 60
End: 2025-03-03
Payer: MEDICARE

## 2025-03-03 VITALS
SYSTOLIC BLOOD PRESSURE: 122 MMHG | WEIGHT: 103 LBS | RESPIRATION RATE: 15 BRPM | HEART RATE: 54 BPM | TEMPERATURE: 97.2 F | DIASTOLIC BLOOD PRESSURE: 77 MMHG | HEIGHT: 60 IN | BODY MASS INDEX: 20.22 KG/M2 | OXYGEN SATURATION: 100 %

## 2025-03-03 DIAGNOSIS — M54.12 CERVICAL RADICULITIS: ICD-10-CM

## 2025-03-03 DIAGNOSIS — G89.29 CHRONIC NECK PAIN: ICD-10-CM

## 2025-03-03 DIAGNOSIS — M54.2 CHRONIC NECK PAIN: ICD-10-CM

## 2025-03-03 DIAGNOSIS — M54.16 LUMBAR NEURITIS: Primary | ICD-10-CM

## 2025-03-03 DIAGNOSIS — M51.26 LUMBAR DISC HERNIATION: ICD-10-CM

## 2025-03-03 PROCEDURE — 99214 OFFICE O/P EST MOD 30 MIN: CPT | Performed by: ANESTHESIOLOGY

## 2025-03-03 ASSESSMENT — PAIN SCALES - GENERAL
PAINLEVEL_OUTOF10: 7
PAINLEVEL_OUTOF10: 7

## 2025-03-03 ASSESSMENT — PAIN - FUNCTIONAL ASSESSMENT: PAIN_FUNCTIONAL_ASSESSMENT: 0-10

## 2025-03-03 ASSESSMENT — ENCOUNTER SYMPTOMS
OCCASIONAL FEELINGS OF UNSTEADINESS: 1
LOSS OF SENSATION IN FEET: 1

## 2025-03-03 NOTE — H&P (VIEW-ONLY)
History Of Present Illness  Karoline Carrera is a 59 y.o. female presenting with   Chief Complaint   Patient presents with    Follow-up       Patient returns does have a history of chronic NECK PAIN TO THE BILATERAL SHOULDER area worse on the LEFT side with a pressure like sensation. She reports her neck pain has been worse lately.      She was seen for chest pain by her NP and had work up done which was negative. Her worst pain currently is into her LLE hip and thigh area.      The pain is constant, worse with activity and better with rest. The pain is sharp, stabbing and shooting to the B/L LE. Denies LE paresthesias, weakness, saddle anesthesia, bowel or bladder incontinence. To manage this pain the patient has attempted Vicodin as prescribed by her PCP. The patient has undergone a IDALIA on May 11 2016 with 50% relief for several weeks with gradual return to baseline.      PAIN SCORE: 7/10 in her neck  mid back with burning.      FHx  Of note: Pt previously reports her father is an alcoholic and she has been traveling back from [place].      Recall: Pt reports her daughter's father was diagnosed with stage 4 bladder cancer in April of 2020 and has reportedly has only 6 months to live. He reportedly is s/p spinal mass resection at  [facility].      PCP: Nora Cain        Past Medical History  She has a past medical history of Other specified health status.    Surgical History  She has a past surgical history that includes Tonsillectomy (04/08/2016); Other surgical history (04/07/2022); and CT angio coronary art with heartflow if score >30% (3/31/2023).     Social History  She reports that she has quit smoking. Her smoking use included cigarettes. She has never used smokeless tobacco. She reports that she does not drink alcohol and does not use drugs.    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     Family History  No family history on file.     Allergies  Sulfa (sulfonamide antibiotics)    Review of  Systems    All other systems reviewed and negative for any deficits. Pertinent positives and negatives were considered in the medical decision making process.        Physical Exam  /77   Pulse 54   Temp 36.2 °C (97.2 °F)   Resp 15   Wt 46.7 kg (103 lb)   SpO2 100%     General: Pt appears stated age     Eyes: Conjunctiva non-icteric and lids without obvious rash or drooping. Pupils are symmetric     Neck: No JVD noted, tracheal position midline.     Musculoskeletal: Gait is grossly normal     Digits/nails show no clubbing or cyanosis     Exam of muscles/joints/bones shows no gross atrophy and no abnormal/involuntary movements in the head/neckNo asymmetry or masses noted in the head/neck     Stability: no subluxation noted on movement of bilateral upper extremities or head/neck     Strength: 4/5 in RLE and 5/5 in LLE   Positive straight leg raise test in RLE  3/5 in LUE and 4/5 in RUE      Range of Motion: WNL      Sensation: decreased to sharp touch in right hand and dec along left C6 dermatome.      Cranial nerves 2-12 are grossly intact     Psychiatric: Pt is alert and oriented to time, place and person.         Assessment/Plan   1. Chronic neck pain        2. Cervical radiculitis        3. Lumbar disc herniation        4. Lumbar neuritis              · Chronic low back pain (724.2,338.29) (M54.50,G89.29)   · Lumbosacral neuritis (724.4) (M54.17)   · Lumbar disc herniation (722.10) (M51.26)   · Lumbar radiculopathy, right (724.4) (M54.16)   · Cervical disc herniation (722.0) (M50.20)   · Cervical radicular pain (723.4) (M54.12)     Provider Impressions        1. The pt attempted Gabapentin and LYRICA, but reports it limits her mental function and was difficult for her to work and drive.      Pt previously signed a [employer] contract on 4-7-2022 and it was reviewed line by line .  Pt will provide UDS on 4-7-2022.      Pt has since d/c Lyrica on her own due to feeling foggy.      Recall: She was doing well  "with Cymbalta 60mg once a day. She reports her neck and leg pain has reduced since starting this medication. We previously did increase her dosage to Cymbalta 80mg at bedtime and she reported she felt drowsy and has since weaned herself of she also reported nightmares. Her PCP took her off of this medication due to \"brain fog\" which improved after she discontinued it.      WILL ALSO OBTAIN AN MRI OF THE THORACIC SPINE since her pain has not improved. This was reportedly denied by her insurance.      She has attempted over 6 weeks of doctor supervised therapy with no improvement in her mid back pain. She has also been on Duloxetine and Lidoderm patches.      Pt worked on her exercises for 30 minutes a day for over 6 weeks. She focused on stretching her mid back with flexion and extension and also worked on core strengthening including Karen exercises and crunches. She reports no improvement in her pain since doing these exercises.      Pt does report her burning pain and anxiety has improved since being on Lyrica.      2. The pt had an MRI in 2016 which showed She has multiple disc herniations in her cervical spine and has severe LEFT sided foraminal stenosis at C5/6 and C6/7.      I again reviewed the patients MRI findings (8-) in detail, including review of the actual images and provided a detailed explanation of the findings using a spine model. I also compared this to her previous MRI from 2014. There is a new right sided disc herniation with recess and foraminal stenosis at the L4/5 which is new when compared to her previous MRI from 2014.      3. The pt does have MRI evidence of lumbar disc herniation and did obtain greater than 75% relief with her last lumbar injection. She has continued on an intensive home exercise and therapy program that was reviewed in the office today for over 6 months. She has also continued on Vicodin 5/325mg and Cymbalta 60mg once daily as well for the past 6 months.     Her " last cervical injection was 5-2020 with 60% relief lasting 1 months time. She reports her pain has returned with numbness in her left arm. She reports she has trouble sleeping due to her pain now 1 month later. After her injection she reports she was able to sleep with less pain.      She has continued home PT exercises as reviewed with us in the office today and at her previous visits. She has been working on exercises as reviewed for over 6 consecutive months and has also been on Cymbalta and Tizanidine for over 6 months as well.     Pt had a new MRI of the LUMBAR spine on 5-3-2024 and it showed a LEFT sided disc herniation at the L3/4 and L5/S1 level and a right sided disc herniation at the L4/5 level.    4. The patient is a candidate for a IDALIA, TESI vs LESI L5/S1 vs LEFT versus RIGHT L4 AND L5 LTR to treat back and radicular pain. I spent time with the patient discussing all of the risks, benefits, and alternatives to this measure. Including but not limited to spinal infection, epidural hematoma/abscess, paralysis, nerve injury, steroid effects, and spinal headache. The patient understands and agrees to proceed.     Her most recent LUMBAR injection was 6- and prior to that 2- for a right LTR and prior to that on 2-2021 and she again reported 50% last lumbar injection was over 6 months ago in January / March of 2020. She reported 50% relief with that injection and that she was able to sleep and walk with less pain. However with evidence of a new disc herniation at L4/5 as noted above would recommend a repeat procedure since she reports trouble sleeping due to pain.      Her last TESI was on 2- and she reported greater than 50% relief of her pain that lasted for 1 months time and has slowly worn off. She has been able to stand and pivot with much less pain.     Her last Cervical injection was on 12-3-2024 and she reported 50-60% relief of her pain that has since worn off after 2 months time.       5. We again did discuss the risks, benefits, and alternatives to chronic opioid therapy and that usage can be a danger to life. We also discussed proper and safe storage of medication to prevent unauthorized usage. The patient understands and will use the medicine responsibly as managed by her PCP Nora Cain. She reports she is actively being weaned off of this medication.      Recall: We did discussed OIH at several visits. Pt reports she is interested in weaning off of this medication.     6. I have referred the patient to physical therapy/aqua therapy to learn and perform exercises to strengthen core/extremties, maintain stabilization, increase range of motion, and reduce pain.    Pt completed over 6 weeks of aquatherapy and reports it was very helpful for her, but continues to suffer from radicular pain. She has also failed over 6 weeks of Vicodin, Lidoderm patches, Lyrica, and Cyclobenzaprine.     Given her weakness on exam and her above findings I would recommend a new MRI of the CERVICAL spine. Her last cervical MRI was in 2016.     6. I extensively reviewed the patients CERVICAL MRI (9-2024) findings in detail, including review of the actual images and provided a detailed explanation of the findings using a spine model. There are disc herniations that are unchanged at the C4/5, C5/6, and C6/7 level.         Matthew Neal MD     I spent time with the patient reviewing their imaging and discussing the risks benefits and alternatives to the above plan. A total of 30 minutes was spent reviewing the data and greater than 50% of that time was with the patient during the face to face encounter discussing treatment options both surgical, non-surgical, and minimally invasive techniques.

## 2025-03-03 NOTE — PROGRESS NOTES
History Of Present Illness  Karoline Carrera is a 59 y.o. female presenting with   Chief Complaint   Patient presents with    Follow-up       Patient returns does have a history of chronic NECK PAIN TO THE BILATERAL SHOULDER area worse on the LEFT side with a pressure like sensation. She reports her neck pain has been worse lately.      She was seen for chest pain by her NP and had work up done which was negative. Her worst pain currently is into her LLE hip and thigh area.      The pain is constant, worse with activity and better with rest. The pain is sharp, stabbing and shooting to the B/L LE. Denies LE paresthesias, weakness, saddle anesthesia, bowel or bladder incontinence. To manage this pain the patient has attempted Vicodin as prescribed by her PCP. The patient has undergone a IDALIA on May 11 2016 with 50% relief for several weeks with gradual return to baseline.      PAIN SCORE: 7/10 in her neck  mid back with burning.      FHx  Of note: Pt previously reports her father is an alcoholic and she has been traveling back from [place].      Recall: Pt reports her daughter's father was diagnosed with stage 4 bladder cancer in April of 2020 and has reportedly has only 6 months to live. He reportedly is s/p spinal mass resection at  [facility].      PCP: Nora Cain        Past Medical History  She has a past medical history of Other specified health status.    Surgical History  She has a past surgical history that includes Tonsillectomy (04/08/2016); Other surgical history (04/07/2022); and CT angio coronary art with heartflow if score >30% (3/31/2023).     Social History  She reports that she has quit smoking. Her smoking use included cigarettes. She has never used smokeless tobacco. She reports that she does not drink alcohol and does not use drugs.    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     Family History  No family history on file.     Allergies  Sulfa (sulfonamide antibiotics)    Review of  Systems    All other systems reviewed and negative for any deficits. Pertinent positives and negatives were considered in the medical decision making process.        Physical Exam  /77   Pulse 54   Temp 36.2 °C (97.2 °F)   Resp 15   Wt 46.7 kg (103 lb)   SpO2 100%     General: Pt appears stated age     Eyes: Conjunctiva non-icteric and lids without obvious rash or drooping. Pupils are symmetric     Neck: No JVD noted, tracheal position midline.     Musculoskeletal: Gait is grossly normal     Digits/nails show no clubbing or cyanosis     Exam of muscles/joints/bones shows no gross atrophy and no abnormal/involuntary movements in the head/neckNo asymmetry or masses noted in the head/neck     Stability: no subluxation noted on movement of bilateral upper extremities or head/neck     Strength: 4/5 in RLE and 5/5 in LLE   Positive straight leg raise test in RLE  3/5 in LUE and 4/5 in RUE      Range of Motion: WNL      Sensation: decreased to sharp touch in right hand and dec along left C6 dermatome.      Cranial nerves 2-12 are grossly intact     Psychiatric: Pt is alert and oriented to time, place and person.         Assessment/Plan   1. Chronic neck pain        2. Cervical radiculitis        3. Lumbar disc herniation        4. Lumbar neuritis              · Chronic low back pain (724.2,338.29) (M54.50,G89.29)   · Lumbosacral neuritis (724.4) (M54.17)   · Lumbar disc herniation (722.10) (M51.26)   · Lumbar radiculopathy, right (724.4) (M54.16)   · Cervical disc herniation (722.0) (M50.20)   · Cervical radicular pain (723.4) (M54.12)     Provider Impressions        1. The pt attempted Gabapentin and LYRICA, but reports it limits her mental function and was difficult for her to work and drive.      Pt previously signed a [employer] contract on 4-7-2022 and it was reviewed line by line .  Pt will provide UDS on 4-7-2022.      Pt has since d/c Lyrica on her own due to feeling foggy.      Recall: She was doing well  "with Cymbalta 60mg once a day. She reports her neck and leg pain has reduced since starting this medication. We previously did increase her dosage to Cymbalta 80mg at bedtime and she reported she felt drowsy and has since weaned herself of she also reported nightmares. Her PCP took her off of this medication due to \"brain fog\" which improved after she discontinued it.      WILL ALSO OBTAIN AN MRI OF THE THORACIC SPINE since her pain has not improved. This was reportedly denied by her insurance.      She has attempted over 6 weeks of doctor supervised therapy with no improvement in her mid back pain. She has also been on Duloxetine and Lidoderm patches.      Pt worked on her exercises for 30 minutes a day for over 6 weeks. She focused on stretching her mid back with flexion and extension and also worked on core strengthening including Karen exercises and crunches. She reports no improvement in her pain since doing these exercises.      Pt does report her burning pain and anxiety has improved since being on Lyrica.      2. The pt had an MRI in 2016 which showed She has multiple disc herniations in her cervical spine and has severe LEFT sided foraminal stenosis at C5/6 and C6/7.      I again reviewed the patients MRI findings (8-) in detail, including review of the actual images and provided a detailed explanation of the findings using a spine model. I also compared this to her previous MRI from 2014. There is a new right sided disc herniation with recess and foraminal stenosis at the L4/5 which is new when compared to her previous MRI from 2014.      3. The pt does have MRI evidence of lumbar disc herniation and did obtain greater than 75% relief with her last lumbar injection. She has continued on an intensive home exercise and therapy program that was reviewed in the office today for over 6 months. She has also continued on Vicodin 5/325mg and Cymbalta 60mg once daily as well for the past 6 months.     Her " last cervical injection was 5-2020 with 60% relief lasting 1 months time. She reports her pain has returned with numbness in her left arm. She reports she has trouble sleeping due to her pain now 1 month later. After her injection she reports she was able to sleep with less pain.      She has continued home PT exercises as reviewed with us in the office today and at her previous visits. She has been working on exercises as reviewed for over 6 consecutive months and has also been on Cymbalta and Tizanidine for over 6 months as well.     Pt had a new MRI of the LUMBAR spine on 5-3-2024 and it showed a LEFT sided disc herniation at the L3/4 and L5/S1 level and a right sided disc herniation at the L4/5 level.    4. The patient is a candidate for a IDALIA, TESI vs LESI L5/S1 vs LEFT versus RIGHT L4 AND L5 LTR to treat back and radicular pain. I spent time with the patient discussing all of the risks, benefits, and alternatives to this measure. Including but not limited to spinal infection, epidural hematoma/abscess, paralysis, nerve injury, steroid effects, and spinal headache. The patient understands and agrees to proceed.     Her most recent LUMBAR injection was 6- and prior to that 2- for a right LTR and prior to that on 2-2021 and she again reported 50% last lumbar injection was over 6 months ago in January / March of 2020. She reported 50% relief with that injection and that she was able to sleep and walk with less pain. However with evidence of a new disc herniation at L4/5 as noted above would recommend a repeat procedure since she reports trouble sleeping due to pain.      Her last TESI was on 2- and she reported greater than 50% relief of her pain that lasted for 1 months time and has slowly worn off. She has been able to stand and pivot with much less pain.     Her last Cervical injection was on 12-3-2024 and she reported 50-60% relief of her pain that has since worn off after 2 months time.       5. We again did discuss the risks, benefits, and alternatives to chronic opioid therapy and that usage can be a danger to life. We also discussed proper and safe storage of medication to prevent unauthorized usage. The patient understands and will use the medicine responsibly as managed by her PCP Nora Cain. She reports she is actively being weaned off of this medication.      Recall: We did discussed OIH at several visits. Pt reports she is interested in weaning off of this medication.     6. I have referred the patient to physical therapy/aqua therapy to learn and perform exercises to strengthen core/extremties, maintain stabilization, increase range of motion, and reduce pain.    Pt completed over 6 weeks of aquatherapy and reports it was very helpful for her, but continues to suffer from radicular pain. She has also failed over 6 weeks of Vicodin, Lidoderm patches, Lyrica, and Cyclobenzaprine.     Given her weakness on exam and her above findings I would recommend a new MRI of the CERVICAL spine. Her last cervical MRI was in 2016.     6. I extensively reviewed the patients CERVICAL MRI (9-2024) findings in detail, including review of the actual images and provided a detailed explanation of the findings using a spine model. There are disc herniations that are unchanged at the C4/5, C5/6, and C6/7 level.         Matthew Neal MD     I spent time with the patient reviewing their imaging and discussing the risks benefits and alternatives to the above plan. A total of 30 minutes was spent reviewing the data and greater than 50% of that time was with the patient during the face to face encounter discussing treatment options both surgical, non-surgical, and minimally invasive techniques.

## 2025-04-01 ENCOUNTER — HOSPITAL ENCOUNTER (OUTPATIENT)
Dept: PAIN MEDICINE | Facility: CLINIC | Age: 60
Discharge: HOME | End: 2025-04-01
Payer: MEDICARE

## 2025-04-01 VITALS
WEIGHT: 110 LBS | HEIGHT: 60 IN | HEART RATE: 81 BPM | OXYGEN SATURATION: 98 % | BODY MASS INDEX: 21.6 KG/M2 | RESPIRATION RATE: 18 BRPM | DIASTOLIC BLOOD PRESSURE: 70 MMHG | SYSTOLIC BLOOD PRESSURE: 125 MMHG | TEMPERATURE: 98.1 F

## 2025-04-01 DIAGNOSIS — M54.16 LUMBAR NEURITIS: ICD-10-CM

## 2025-04-01 PROCEDURE — 2500000005 HC RX 250 GENERAL PHARMACY W/O HCPCS: Performed by: ANESTHESIOLOGY

## 2025-04-01 PROCEDURE — 2500000004 HC RX 250 GENERAL PHARMACY W/ HCPCS (ALT 636 FOR OP/ED): Performed by: ANESTHESIOLOGY

## 2025-04-01 PROCEDURE — 62323 NJX INTERLAMINAR LMBR/SAC: CPT | Performed by: ANESTHESIOLOGY

## 2025-04-01 PROCEDURE — 2500000002 HC RX 250 W HCPCS SELF ADMINISTERED DRUGS (ALT 637 FOR MEDICARE OP, ALT 636 FOR OP/ED): Performed by: ANESTHESIOLOGY

## 2025-04-01 RX ORDER — ASPIRIN 81 MG/1
81 TABLET ORAL DAILY
COMMUNITY

## 2025-04-01 RX ORDER — DIAZEPAM 5 MG/1
10 TABLET ORAL ONCE
Status: COMPLETED | OUTPATIENT
Start: 2025-04-01 | End: 2025-04-01

## 2025-04-01 RX ORDER — SODIUM CHLORIDE 9 MG/ML
INJECTION, SOLUTION INTRAMUSCULAR; INTRAVENOUS; SUBCUTANEOUS AS NEEDED
Status: COMPLETED | OUTPATIENT
Start: 2025-04-01 | End: 2025-04-01

## 2025-04-01 RX ORDER — LIDOCAINE HYDROCHLORIDE 5 MG/ML
INJECTION, SOLUTION INFILTRATION; INTRAVENOUS AS NEEDED
Status: COMPLETED | OUTPATIENT
Start: 2025-04-01 | End: 2025-04-01

## 2025-04-01 RX ORDER — TRIAMCINOLONE ACETONIDE 40 MG/ML
INJECTION, SUSPENSION INTRA-ARTICULAR; INTRAMUSCULAR AS NEEDED
Status: COMPLETED | OUTPATIENT
Start: 2025-04-01 | End: 2025-04-01

## 2025-04-01 RX ADMIN — SODIUM CHLORIDE 10 ML: 9 INJECTION, SOLUTION INTRAMUSCULAR; INTRAVENOUS; SUBCUTANEOUS at 11:01

## 2025-04-01 RX ADMIN — LIDOCAINE HYDROCHLORIDE 10 ML: 5 INJECTION, SOLUTION INFILTRATION at 11:01

## 2025-04-01 RX ADMIN — DIAZEPAM 10 MG: 5 TABLET ORAL at 10:31

## 2025-04-01 RX ADMIN — TRIAMCINOLONE ACETONIDE 40 MG: 40 INJECTION, SUSPENSION INTRA-ARTICULAR; INTRAMUSCULAR at 11:02

## 2025-04-01 ASSESSMENT — PAIN - FUNCTIONAL ASSESSMENT
PAIN_FUNCTIONAL_ASSESSMENT: 0-10
PAIN_FUNCTIONAL_ASSESSMENT: 0-10

## 2025-04-01 ASSESSMENT — PAIN SCALES - GENERAL
PAINLEVEL_OUTOF10: 8
PAINLEVEL_OUTOF10: 0 - NO PAIN

## 2025-04-01 ASSESSMENT — PAIN DESCRIPTION - DESCRIPTORS: DESCRIPTORS: THROBBING

## 2025-06-27 ENCOUNTER — OFFICE VISIT (OUTPATIENT)
Dept: PAIN MEDICINE | Facility: CLINIC | Age: 60
End: 2025-06-27
Payer: MEDICARE

## 2025-06-27 VITALS
SYSTOLIC BLOOD PRESSURE: 116 MMHG | RESPIRATION RATE: 15 BRPM | DIASTOLIC BLOOD PRESSURE: 59 MMHG | WEIGHT: 110 LBS | TEMPERATURE: 96.4 F | OXYGEN SATURATION: 100 % | HEIGHT: 60 IN | HEART RATE: 47 BPM | BODY MASS INDEX: 21.6 KG/M2

## 2025-06-27 DIAGNOSIS — M51.26 LUMBAR DISC HERNIATION: ICD-10-CM

## 2025-06-27 DIAGNOSIS — M54.16 LUMBAR NEURITIS: ICD-10-CM

## 2025-06-27 DIAGNOSIS — M54.12 CERVICAL RADICULITIS: ICD-10-CM

## 2025-06-27 DIAGNOSIS — M47.812 CERVICAL SPONDYLOSIS: Primary | ICD-10-CM

## 2025-06-27 DIAGNOSIS — G89.29 CHRONIC NECK PAIN: ICD-10-CM

## 2025-06-27 DIAGNOSIS — M54.2 CHRONIC NECK PAIN: ICD-10-CM

## 2025-06-27 PROCEDURE — 99214 OFFICE O/P EST MOD 30 MIN: CPT | Performed by: ANESTHESIOLOGY

## 2025-06-27 ASSESSMENT — PAIN SCALES - GENERAL
PAINLEVEL_OUTOF10: 5 - MODERATE PAIN
PAINLEVEL_OUTOF10: 5

## 2025-06-27 ASSESSMENT — PAIN DESCRIPTION - DESCRIPTORS: DESCRIPTORS: DISCOMFORT

## 2025-06-27 ASSESSMENT — ENCOUNTER SYMPTOMS
LOSS OF SENSATION IN FEET: 0
OCCASIONAL FEELINGS OF UNSTEADINESS: 1

## 2025-06-27 ASSESSMENT — PAIN - FUNCTIONAL ASSESSMENT: PAIN_FUNCTIONAL_ASSESSMENT: 0-10

## 2025-06-27 NOTE — PROGRESS NOTES
History Of Present Illness  Karoline Carrera is a 59 y.o. female presenting with   Chief Complaint   Patient presents with    Follow-up       Patient returns does have a history of chronic NECK PAIN TO THE BILATERAL SHOULDER area worse on the LEFT side with a pressure like sensation. She reports her neck pain has been worse lately.      She was seen for chest pain by her NP and had work up done which was negative. Her worst pain currently is into her LLE hip and thigh area.      The pain is constant, worse with activity and better with rest. The pain is sharp, stabbing and shooting to the B/L LE. Denies LE paresthesias, weakness, saddle anesthesia, bowel or bladder incontinence. To manage this pain the patient has attempted Vicodin as prescribed by her PCP. The patient has undergone a IDALIA on May 11 2016 with 50% relief for several weeks with gradual return to baseline.      PAIN SCORE: 7/10 in her neck  mid back with burning.      FHx  Of note: Pt previously reports her father is an alcoholic and she has been traveling back from [place].      Recall: Pt reports her daughter's father was diagnosed with stage 4 bladder cancer in April of 2020 and has reportedly has only 6 months to live. He reportedly is s/p spinal mass resection at  [facility].      PCP: Nora Cain        Past Medical History  She has a past medical history of Other specified health status.    Surgical History  She has a past surgical history that includes Tonsillectomy (04/08/2016); Other surgical history (04/07/2022); and CT angio coronary art with heartflow if score >30% (3/31/2023).     Social History  She reports that she has quit smoking. Her smoking use included cigarettes. She has never used smokeless tobacco. She reports that she does not drink alcohol and does not use drugs.    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     Family History  No family history on file.     Allergies  Sulfa (sulfonamide antibiotics)    Review of  Systems    All other systems reviewed and negative for any deficits. Pertinent positives and negatives were considered in the medical decision making process.        Physical Exam  /59   Pulse (!) 47   Temp 35.8 °C (96.4 °F)   Resp 15   Wt 49.9 kg (110 lb)   SpO2 100%     General: Pt appears stated age     Eyes: Conjunctiva non-icteric and lids without obvious rash or drooping. Pupils are symmetric     Neck: No JVD noted, tracheal position midline.     Musculoskeletal: Gait is grossly normal     Digits/nails show no clubbing or cyanosis     Exam of muscles/joints/bones shows no gross atrophy and no abnormal/involuntary movements in the head/neckNo asymmetry or masses noted in the head/neck     Stability: no subluxation noted on movement of bilateral upper extremities or head/neck     Strength: 4/5 in RLE and 5/5 in LLE   Positive straight leg raise test in RLE  3/5 in LUE and 4/5 in RUE      Range of Motion: WNL      Sensation: decreased to sharp touch in right hand and dec along left C6 dermatome.      Cranial nerves 2-12 are grossly intact     Psychiatric: Pt is alert and oriented to time, place and person.         Assessment/Plan   1. Cervical radiculitis        2. Chronic neck pain        3. Lumbar disc herniation        4. Lumbar neuritis              · Chronic low back pain (724.2,338.29) (M54.50,G89.29)   · Lumbosacral neuritis (724.4) (M54.17)   · Lumbar disc herniation (722.10) (M51.26)   · Lumbar radiculopathy, right (724.4) (M54.16)   · Cervical disc herniation (722.0) (M50.20)   · Cervical radicular pain (723.4) (M54.12)     Provider Impressions     1. The pt attempted Gabapentin and LYRICA, but reports it limits her mental function and was difficult for her to work and drive.      Pt previously signed a [employer]rica contract on 4-7-2022 and it was reviewed line by line .  Pt will provide UDS on 4-7-2022.      Pt has since d/c Lyrica on her own due to feeling foggy.      Recall: She was doing well  "with Cymbalta 60mg once a day. She reports her neck and leg pain has reduced since starting this medication. We previously did increase her dosage to Cymbalta 80mg at bedtime and she reported she felt drowsy and has since weaned herself of she also reported nightmares. Her PCP took her off of this medication due to \"brain fog\" which improved after she discontinued it.      WILL ALSO OBTAIN AN MRI OF THE THORACIC SPINE since her pain has not improved. This was reportedly denied by her insurance.      She has attempted over 6 weeks of doctor supervised therapy with no improvement in her mid back pain. She has also been on Duloxetine and Lidoderm patches.      Pt worked on her exercises for 30 minutes a day for over 6 weeks. She focused on stretching her mid back with flexion and extension and also worked on core strengthening including Karen exercises and crunches. She reports no improvement in her pain since doing these exercises.      Pt does report her burning pain and anxiety has improved since being on Lyrica.      2. The pt had an MRI in 2016 which showed She has multiple disc herniations in her cervical spine and has severe LEFT sided foraminal stenosis at C5/6 and C6/7.      I again reviewed the patients MRI findings (8-) in detail, including review of the actual images and provided a detailed explanation of the findings using a spine model. I also compared this to her previous MRI from 2014. There is a new right sided disc herniation with recess and foraminal stenosis at the L4/5 which is new when compared to her previous MRI from 2014.      3. The pt does have MRI evidence of lumbar disc herniation and did obtain greater than 75% relief with her last lumbar injection. She has continued on an intensive home exercise and therapy program that was reviewed in the office today for over 6 months. She has also continued on Vicodin 5/325mg and Cymbalta 60mg once daily as well for the past 6 months.     Her " last cervical injection was 5-2020 with 60% relief lasting 1 months time. She reports her pain has returned with numbness in her left arm. She reports she has trouble sleeping due to her pain now 1 month later. After her injection she reports she was able to sleep with less pain.      She has continued home PT exercises as reviewed with us in the office today and at her previous visits. She has been working on exercises as reviewed for over 6 consecutive months and has also been on Cymbalta and Tizanidine for over 6 months as well.     Pt had a new MRI of the LUMBAR spine on 5-3-2024 and it showed a LEFT sided disc herniation at the L3/4 and L5/S1 level and a right sided disc herniation at the L4/5 level.    4. The patient is a candidate for a bilateral MEDIAL BRANCH BLOCK AT C5/6 AND C6/7 under fluoroscopic guidance. Pt understands Spinal headache, spinal fluid leak, Skin or Spinal Bleeding, skin or spinal infection, nerve/ neurologic injury either temporary or permanent. Pain may worsen or not improve. Pt understands all of these risks and agrees to proceed.     5. She has previously undergone IDALIA, TESI vs LESI L5/S1 vs LEFT versus RIGHT L4 AND L5 LTR to treat back and radicular pain. I spent time with the patient discussing all of the risks, benefits, and alternatives to this measure. Including but not limited to spinal infection, epidural hematoma/abscess, paralysis, nerve injury, steroid effects, and spinal headache. The patient understands and agrees to proceed.     Her most recent LUMBAR injection was 6- and prior to that 2- for a right LTR and prior to that on 2-2021 and she again reported 50% last lumbar injection was over 6 months ago in January / March of 2020. She reported 50% relief with that injection and that she was able to sleep and walk with less pain. However with evidence of a new disc herniation at L4/5 as noted above would recommend a repeat procedure since she reports trouble  sleeping due to pain.      Her last TESI was on 2- and she reported greater than 50% relief of her pain that lasted for 1 months time and has slowly worn off. She has been able to stand and pivot with much less pain.     Her last Cervical injection was on 4-1-2025, 12-3-2024 and she reported 50-60% relief of her pain that has since worn off after 2 months time.      5. We again did discuss the risks, benefits, and alternatives to chronic opioid therapy and that usage can be a danger to life. We also discussed proper and safe storage of medication to prevent unauthorized usage. The patient understands and will use the medicine responsibly as managed by her PCP Nora Cain. She reports she is actively being weaned off of this medication.      Recall: We did discussed OIH at several visits. Pt reports she is interested in weaning off of this medication.     6. I have referred the patient to physical therapy/aqua therapy to learn and perform exercises to strengthen core/extremties, maintain stabilization, increase range of motion, and reduce pain.    Pt completed over 6 weeks of aquatherapy and reports it was very helpful for her, but continues to suffer from radicular pain. She has also failed over 6 weeks of Vicodin, Lidoderm patches, Lyrica, and Cyclobenzaprine.     Given her weakness on exam and her above findings I would recommend a new MRI of the CERVICAL spine. Her last cervical MRI was in 2016.     7. I extensively reviewed the patients CERVICAL MRI (9-2024) findings in detail, including review of the actual images and provided a detailed explanation of the findings using a spine model. There are disc herniations that are unchanged at the C4/5, C5/6, and C6/7 level.       Matthew Neal MD     I spent time with the patient reviewing their imaging and discussing the risks benefits and alternatives to the above plan. A total of 30 minutes was spent reviewing the data and greater than 50% of that time was  with the patient during the face to face encounter discussing treatment options both surgical, non-surgical, and minimally invasive techniques.